# Patient Record
Sex: FEMALE | Race: WHITE
[De-identification: names, ages, dates, MRNs, and addresses within clinical notes are randomized per-mention and may not be internally consistent; named-entity substitution may affect disease eponyms.]

---

## 2018-03-06 ENCOUNTER — HOSPITAL ENCOUNTER (INPATIENT)
Dept: HOSPITAL 56 - MW.OB | Age: 33
LOS: 2 days | Discharge: HOME | End: 2018-03-08
Attending: OBSTETRICS & GYNECOLOGY | Admitting: OBSTETRICS & GYNECOLOGY
Payer: COMMERCIAL

## 2018-03-06 DIAGNOSIS — Z3A.39: ICD-10-CM

## 2018-03-06 RX ADMIN — KETOROLAC TROMETHAMINE SCH MG: 30 INJECTION, SOLUTION INTRAMUSCULAR at 14:56

## 2018-03-06 RX ADMIN — KETOROLAC TROMETHAMINE SCH MG: 30 INJECTION, SOLUTION INTRAMUSCULAR at 20:59

## 2018-03-06 RX ADMIN — KETOROLAC TROMETHAMINE SCH MG: 30 INJECTION, SOLUTION INTRAMUSCULAR at 09:13

## 2018-03-06 NOTE — PCM.OPNOTE
- General Post-Op/Procedure Note


Date of Surgery/Procedure: 18


Operative Procedure(s): repeat low tranvsever 


Findings: 





normal pelvis, liveborn female APGAR  weight 3850 grams,


Pre Op Diagnosis: 39 weeks prior , decines vaginal trial of labor


Post-Op Diagnosis: Same


Anesthesia Technique: General ET Tube


Primary Surgeon: Randi Tripp


Anesthesia Provider: Angel Duarte


Assistant: Loan Polanco


Pathology: 





none


Fluid Replacement, Intraop: 1,000


Output, Urine Amount: 20


EBL in mLs: 500


Complications: None Known


Condition: Good

## 2018-03-06 NOTE — OR
SURGEON:

Randi Tripp M.D.

 

DATE OF PROCEDURE:  2018

 

PREOPERATIVE DIAGNOSES:

1. 39 week intrauterine pregnancy.

2. Prior  delivery.

3. Desires repeat , declines vaginal trial of labor.

 

POSTOPERATIVE DIAGNOSES:

1. 39 week intrauterine pregnancy.

2. Prior  delivery.

3. Desires repeat , declines vaginal trial of labor.

 

PROCEDURE:

Repeat low-transverse  section.

 

ANESTHESIA:

Spinal.

 

ESTIMATED BLOOD LOSS:

500 mL.

 

FLUIDS:

1000 mL crystalloid.

 

FINDINGS:

Liveborn female, Apgar scores 9 and 9.  Weight is 3850 g.  Normal-appearing

uterus, tubes, and ovaries.

 

COMPLICATIONS:

None known.

 

DISPOSITION:

Stable to recovery.

 

BRIEF HISTORY:

This is a 32-year-old female, she is -0-0-1.  She presents at 39 weeks'

gestation for repeat .  She has been counseled regarding risks and

benefits of vaginal trial of labor versus repeat .  She desires to

proceed with repeat  delivery with risks discussed including bleeding,

infection, injury to bowel, bladder, blood vessels, ureter, or other organs,

risk of thromboembolic event, and risk of anesthesia.  Understanding all these

risks, she does desire to proceed.

 

DESCRIPTION OF PROCEDURE:

With the patient in left tilt position, under adequate spinal analgesia, the

abdomen was prepped with chlorhexidine and draped in usual fashion for abdominal

surgery.  SCDs were in place.  Allan catheter had been placed and she had

received 2 g of Ancef IV and an appropriate time-out was held.  After

documentation of adequate analgesia, the prior cicatrix was excised and the

incision was carried through the subcutaneous tissue to the fascia, which was

scored transversely in the midline.  The fascial incision was extended laterally

using curved Perkins scissors and elevated from the underlying rectus muscle using

sharp and blunt dissection.  The rectus muscles were bluntly  in the

midline.  A finger was used to enter the peritoneal cavity.  There were no

anterior adhesions.  The incision was extended using sharp and blunt dissection.

The Trace O  retractor was placed.  The visceroperitoneum over the

lower uterine segment was incised to develop an adequate bladder flap.  A

transverse curvilinear incision was made with a scalpel over the lower uterine

segment and a finger was used to enter the amniotic cavity.  The incision was

extended using blunt dissection.  The fetal head was delivered via the uterine

incision.  The infant was bulb suctioned by nose and mouth.  Subsequent delivery

of the infant's shoulders and body was performed.  The cord was clamped x2 and

cut and the infant was handed to the nurse in attendance at delivery.  The

infant is a liveborn female, Apgar scores 9 and 9 weighing 3850 g.  Cord blood

was collected for cord ABGs as well as routine cord blood sampling.  The

placenta was removed by manual extraction.  Pitocin had been given 20 units and

the cervix was opened with a ring forceps.  The uterine incision was closed with

a running lock suture of 0 Polysorb.  The uterus continued to be somewhat boggy

although there was no active bleeding.  I did ask Anesthesia to give 0.2 mg of

Methergine IM.  Shortly thereafter, the uterus was firm.  The uterine incision

was then closed with a second layer for imbrication of 0 Polysorb.  The

posterior cul-de-sac and paracolic gutters were cleaned with a wet laparotomy

tape.  The uterus, tubes, and ovaries were inspected and appeared normal.  The

uterine incision was inspected and it was hemostatic, therefore the Trace O C-

section retractor was placed.  A final inspection was performed and then the

rectus muscle and peritoneum were loosely approximated in the midline using a

running mattress suture of 0 Polysorb.  The posterior aspect of the fascia was

inspected, any areas of bleeding that were noted were cauterized.  The fascial

incision was closed with a running suture of 0 Polysorb.  Subcutaneous tissue

was copiously irrigated.  Any areas of bleeding that were noted were cauterized.

The deep subcutaneous tissue was closed with a running suture of 0 Polysorb.

The skin was closed with a running subcuticular suture of 3-0 Polysorb followed

by Dermabond.  Final sponge, needle, and instrument counts were reported as

correct.  There were no known complications.  Infant went to full-term nursery

in good condition.  Mother remains in recovery in good condition.

 

 

NED / LUCI

DD:  2018 09:05:49

DT:  2018 13:33:01

Job #:  [id number]

## 2018-03-06 NOTE — [id number]
Post Anesthesia Note





- EVALUATION WITHIN 48HRS OF ANESTHETIC


Vital Signs in Normal Range: Yes


Patient Participated in Evaluation: Yes (still feeling tingling in legs)


Respiratory Function Stable: Yes


Airway Patent: Yes


Cardiovascular Function Stable: Yes


Hydration Status Stable: Yes


Pain Control Satisfactory: Yes


Nausea and Vomiting Control Satisfactory: Yes


Mental Status Recovered: Yes (light headed feeling. discussed stopping benedryl 

for pruritis)


Resp Rate: 18





- COMMENTS/OBSERVATIONS


Free Text/Narrative:: 





also may be side effect of nubain given in operating room

## 2018-03-06 NOTE — PCM.PREANE
Preanesthetic Assessment





- Anesthesia/Transfusion/Family Hx


Anesthesia History: Prior Anesthesia Without Reaction


Other Type of Anesthesia Reaction Comment: Family History of Anesthesia:  The 

patient is "not sure".  


Family History of Anesthesia Reaction: No


Transfusion History: No Prior Transfusion(s)





- Review of Systems


General: No Symptoms


Pulmonary: No Symptoms


Cardiovascular: No Symptoms


Gastrointestinal: No Symptoms


Neurological: No Symptoms


Other: Reports: None





- Physical Assessment


NPO Status Date: 18


Height: 1.75 m


Weight: 80.286 kg


ASA Class: 2


Mental Status: Alert & Oriented x3


Airway Class: Mallampati = 1


Dentition: Reports: Normal Dentition


ROM/Head Extension: Full


Lungs: Clear to Auscultation, Normal Respiratory Effort


Cardiovascular: Regular Rate, Regular Rhythm





- Lab


Values: 





 Laboratory Last Values











WBC  9.42 K/uL (4.0-11.0)   18  05:27    


 


RBC  4.08 M/uL (4.30-5.90)  L  18  05:27    


 


Hgb  13.1 g/dL (12.0-16.0)   18  05:27    


 


Hct  38.8 % (36.0-46.0)   18  05:27    


 


MCV  95.1 fL (80.0-98.0)   18  05:27    


 


MCH  32.1 pg (27.0-32.0)  H  18  05:27    


 


MCHC  33.8 g/dL (31.0-37.0)   18  05:27    


 


RDW Std Deviation  48.9 fl (28.0-62.0)   18  05:27    


 


RDW Coeff of Isidro  14 % (11.0-15.0)   18  05:27    


 


Plt Count  279 K/uL (150-400)   18  05:27    


 


MPV  9.50 fL (7.40-12.00)   18  05:27    


 


Nucleated RBC %  0.0 /100WBC  18  05:27    


 


Nucleated RBCs #  0 K/uL  18  05:27    


 


POC Glucose  79 mg/dL ()   18  05:27    














- Allergies


Allergies/Adverse Reactions: 


 Allergies











Allergy/AdvReac Type Severity Reaction Status Date / Time


 


penicillin G Allergy  Rash Verified 18 08:34


 


Sulfa (Sulfonamide Allergy  Rash Verified 18 08:34





Antibiotics)     














- Blood


Blood Available: Yes





- Anesthesia Plan


Pre-Op Medication Ordered: Antacids





- Acknowledgements


Anesthesia Type Planned: Spinal


Pt an Appropriate Candidate for the Planned Anesthesia: Yes


Alternatives and Risks of Anesthesia Discussed w Pt/Guardian: Yes


Pt/Guardian Understands and Agrees with Anesthesia Plan: Yes


Additional Comments: 





scheduled repeat elective c/section. has gest DM, taking hs insulin, cut last 

nights dose in half.  blood sugar at 0630 this am was in 70s, asymptomatic.  

Will start piggyback infusion of D5, preoperatively. 





PreAnesthesia Questionnaire


HEENT History: Reports: Other (See Below)


Other HEENT History: wears glasses/contacts


Cardiovascular History: Reports: None


Respiratory History: Reports: None


Gastrointestinal History: Reports: None


Genitourinary History: Reports: None


OB/GYN History: Reports: Pregnancy


Musculoskeletal History: Reports: None


Neurological History: Reports: None


Psychiatric History: Reports: None


Endocrine/Metabolic History: Reports: Diabetes, Gestational


Hematologic History: Reports: None


Immunologic History: Reports: None


Oncologic (Cancer) History: Reports: None


Dermatologic History: Reports: None





- Past Surgical History


Head Surgeries/Procedures: Reports: None


HEENT Surgical History: Reports: None


Cardiovascular Surgical History: Reports: None


Respiratory Surgical History: Reports: None


GI Surgical History: Reports: None


Female  Surgical History: Reports:  Section


Endocrine Surgical History: Reports: None


Neurological Surgical History: Reports: None


Musculoskeletal Surgical History: Reports: None


Oncologic Surgical History: Reports: None


Dermatological Surgical History: Reports: None





- SUBSTANCE USE


Smoking Status *Q: Unknown Ever Smoked


Second Hand Smoke Exposure: Yes


Recreational Drug Use History: No





- HOME MEDS


Home Medications: 


 Home Meds





Nph, Human Insulin Isophane [HumuLIN N] 15 units SUBCUT BEDTIME 16 [

History]


PNV95/Ferrous Fumarate/FA [Prenatal Vitamin Tablet] 1 tab PO DAILY 18 [

History]











- CURRENT (IN HOUSE) MEDS


Current Meds: 





 Current Medications





Citric Acid/Sodium Citrate (Bicitra Solution)  30 ml PO .ONCE TARAN


   Last Admin: 18 07:08 Dose:  30 ml


Lactated Ringer's (Ringers, Lactated)  1,000 mls @ 500 mls/hr IV .BOLUS TARAN


   Last Admin: 18 06:09 Dose:  500 mls/hr


Oxytocin/Sodium Chloride (Oxytocin 30 Unit/500 Ml-Ns)  30 unit in 500 mls @ 250 

mls/hr IV TITRATE TARAN


Sodium Chloride (Saline Flush)  10 ml FLUSH ASDIRECTED PRN


   PRN Reason: Keep Vein Open


Sodium Chloride (Saline Flush)  2.5 ml FLUSH ASDIRECTED PRN


   PRN Reason: Keep Vein Open





Discontinued Medications





Cefazolin Sodium (Ancef) Confirm Administered Dose 2 gm .ROUTE .STK-MED ONE


   Stop: 18 07:32


Ephedrine Sulfate (Ephedrine Sulfate) Confirm Administered Dose 50 mg .ROUTE 

.STK-MED ONE


   Stop: 18 07:33


Fentanyl (Sublimaze) Confirm Administered Dose 100 mcg .ROUTE .STK-MED ONE


   Stop: 18 07:30


Cefazolin Sodium/Dextrose 2 gm (/ Premix)  50 mls @ 100 mls/hr IV ONETIME ONE


   Stop: 18 05:36


Sodium Chloride (Normal Saline) Confirm Administered Dose 20 mls @ as directed 

.ROUTE .STK-MED ONE


   Stop: 18 07:33


Morphine Sulfate (Duramorph Pf) Confirm Administered Dose 1 mg .ROUTE .STK-MED 

ONE


   Stop: 18 07:30


Oxytocin (Pitocin) Confirm Administered Dose 20 unit .ROUTE .STK-MED ONE


   Stop: 18 07:33

## 2018-03-07 LAB
CHLORIDE SERPL-SCNC: 105 MMOL/L (ref 98–107)
SODIUM SERPL-SCNC: 138 MMOL/L (ref 136–145)

## 2018-03-07 RX ADMIN — OXYCODONE HYDROCHLORIDE AND ACETAMINOPHEN PRN TAB: 5; 325 TABLET ORAL at 20:42

## 2018-03-07 RX ADMIN — KETOROLAC TROMETHAMINE SCH MG: 30 INJECTION, SOLUTION INTRAMUSCULAR at 03:23

## 2018-03-07 RX ADMIN — OXYCODONE HYDROCHLORIDE AND ACETAMINOPHEN PRN TAB: 5; 325 TABLET ORAL at 12:45

## 2018-03-07 RX ADMIN — KETOROLAC TROMETHAMINE SCH MG: 30 INJECTION, SOLUTION INTRAMUSCULAR at 09:07

## 2018-03-07 NOTE — PCM.PNPP
- General Info


Date of Service: 18


Functional Status: Reports: Pain Controlled, Tolerating Diet, Ambulating, 

Urinating





- Review of Systems


General: Reports: No Symptoms


HEENT: Reports: No Symptoms


Pulmonary: Reports: No Symptoms


Cardiovascular: Reports: No Symptoms


Gastrointestinal: Reports: No Symptoms


Genitourinary: Reports: No Symptoms


Musculoskeletal: Reports: No Symptoms


Skin: Reports: No Symptoms


Neurological: Reports: No Symptoms


Psychiatric: Reports: No Symptoms





- Patient Data


Vital Signs - Most Recent: 


 Last Vital Signs











Temp  36.6 C   18 08:00


 


Pulse  78   18 08:00


 


Resp  16   18 08:00


 


BP  110/61   18 08:00


 


Pulse Ox  97   18 08:00











Weight - Most Recent: 80.286 kg


I&O - Last 24 Hours: 


 Intake & Output











 18





 22:59 06:59 14:59


 


Intake Total 500 1502 


 


Output Total 750 2400 


 


Balance -250 -898 











Lab Results - Last 24 Hours: 


 Laboratory Results - last 24 hr











  18 Range/Units





  09:18 09:52 05:32 


 


Hgb    11.0 L  (12.0-16.0)  g/dL


 


Hct    32.6 L  (36.0-46.0)  %


 


Sodium     (136-145)  mmol/L


 


Potassium     (3.5-5.1)  mmol/L


 


Chloride     ()  mmol/L


 


Carbon Dioxide     (21.0-32.0)  mmol/L


 


BUN     (7.0-18.0)  mg/dL


 


Creatinine     (0.6-1.0)  mg/dL


 


Est Cr Clr Drug Dosing     mL/min


 


Estimated GFR (MDRD)     ml/min


 


Glucose     ()  mg/dL


 


POC Glucose  112 H    ()  mg/dL


 


Calcium     (8.5-10.1)  mg/dL


 


Fetal Screen   NEGATIVE   (NEGATIVE)  


 


RhIG Candidate?   YES   


 


Rhogam Indicated   YES, BABY RH POS H   














  18 Range/Units





  05:32 


 


Hgb   (12.0-16.0)  g/dL


 


Hct   (36.0-46.0)  %


 


Sodium  138  (136-145)  mmol/L


 


Potassium  4.2  (3.5-5.1)  mmol/L


 


Chloride  105  ()  mmol/L


 


Carbon Dioxide  27.1  (21.0-32.0)  mmol/L


 


BUN  8  (7.0-18.0)  mg/dL


 


Creatinine  0.7  (0.6-1.0)  mg/dL


 


Est Cr Clr Drug Dosing  118.35  mL/min


 


Estimated GFR (MDRD)  > 60.0  ml/min


 


Glucose  78  ()  mg/dL


 


POC Glucose   ()  mg/dL


 


Calcium  8.4 L  (8.5-10.1)  mg/dL


 


Fetal Screen   (NEGATIVE)  


 


RhIG Candidate?   


 


Rhogam Indicated   











Med Orders - Current: 


 Current Medications





Bisacodyl (Dulcolax)  10 mg RECTAL .ONCE PRN


   PRN Reason: Constipation


Diphenhydramine HCl (Benadryl)  25 mg IVPUSH Q6H PRN


   PRN Reason: Itching or Nausea


   Last Admin: 18 13:32 Dose:  25 mg


Docusate Sodium (Colace)  100 mg PO BID [facility]


   Last Admin: 18 21:00 Dose:  100 mg


Emollient Ointment (Lansinoh Hpa)  0 gm TOP ASDIRECTED PRN


   PRN Reason: Sore Nipples


Fentanyl (Sublimaze)  50 mcg IVPUSH Q5M PRN


   PRN Reason: Pain (severe 7-10)


   Stop: 18 08:23


Dextrose/Water (Dextrose 5% In Water)  1,000 mls @ 75 mls/hr IV ASDIRECTED [facility]


Lactated Ringer's (Ringers, Lactated)  1,000 mls @ 125 mls/hr IV ASDIRECTED [facility]


   Last Admin: 18 14:57 Dose:  125 mls/hr


Ibuprofen (Motrin)  800 mg PO Q8H PRN


   PRN Reason: mild pain or fever


Ketorolac Tromethamine (Toradol)  30 mg IVPUSH Q6H [facility]


   Stop: 18 09:01


   Last Admin: 18 03:23 Dose:  30 mg


Nalbuphine HCl (Nubain)  2.5 mg IVPUSH Q3H PRN


   PRN Reason: Pruritis


   Stop: 18 08:23


Ondansetron HCl (Zofran)  4 mg IV Q4H PRN


   PRN Reason: Nausea/Vomiting


Oxycodone/Acetaminophen (Percocet 325-5 Mg)  1 tab PO ONETIME PRN


   PRN Reason: Pain (moderate 4-6)


Oxycodone/Acetaminophen (Percocet 325-5 Mg)  1 tab PO Q4H PRN


   PRN Reason: Pain (moderate 4-6)


Oxycodone/Acetaminophen (Percocet 325-5 Mg)  2 tab PO Q4H PRN


   PRN Reason: Pain (moderate 4-6)





Discontinued Medications





Cefazolin Sodium (Ancef) Confirm Administered Dose 2 gm .ROUTE .STK-MED ONE


   Stop: 18 07:32


Citric Acid/Sodium Citrate (Bicitra Solution)  30 ml PO .ONCE TARAN


   Last Admin: 18 07:08 Dose:  30 ml


Ephedrine Sulfate (Ephedrine Sulfate) Confirm Administered Dose 50 mg .ROUTE 

.STK-MED ONE


   Stop: 18 07:33


Fentanyl (Sublimaze) Confirm Administered Dose 100 mcg .ROUTE .STK-MED ONE


   Stop: 18 07:30


Cefazolin Sodium/Dextrose 2 gm (/ Premix)  50 mls @ 100 mls/hr IV ONETIME ONE


   Stop: 18 05:36


Lactated Ringer's (Ringers, Lactated)  1,000 mls @ 500 mls/hr IV .BOLUS TARAN


   Last Admin: 18 06:09 Dose:  500 mls/hr


Oxytocin/Sodium Chloride (Oxytocin 30 Unit/500 Ml-Ns)  30 unit in 500 mls @ 250 

mls/hr IV TITRATE TARAN


Sodium Chloride (Normal Saline) Confirm Administered Dose 20 mls @ as directed 

.ROUTE .STK-MED ONE


   Stop: 18 07:33


Ketorolac Tromethamine (Toradol) Confirm Administered Dose 30 mg .ROUTE .STK-

MED ONE


   Stop: 18 08:31


Methylergonovine Maleate (Methergine) Confirm Administered Dose 0.2 mg .ROUTE 

.STK-MED ONE


   Stop: 18 08:23


Morphine Sulfate (Duramorph Pf) Confirm Administered Dose 1 mg .ROUTE .STK-MED 

ONE


   Stop: 18 07:30


Nalbuphine HCl (Nubain) Confirm Administered Dose 10 mg .ROUTE .STK-MED ONE


   Stop: 18 08:33


Octyl Cyanoacrylate (Dermabond Advance) Confirm Administered Dose 1 applic 

.ROUTE .STK-MED ONE


   Stop: 18 08:34


Ondansetron HCl (Zofran) Confirm Administered Dose 4 mg .ROUTE .STK-MED ONE


   Stop: 18 08:32


Oxytocin (Pitocin) Confirm Administered Dose 20 unit .ROUTE .STK-MED ONE


   Stop: 18 07:33


Sodium Chloride (Saline Flush)  10 ml FLUSH ASDIRECTED PRN


   PRN Reason: Keep Vein Open


Sodium Chloride (Saline Flush)  2.5 ml FLUSH ASDIRECTED PRN


   PRN Reason: Keep Vein Open











- Infant Interaction


Infant Disposition, Postpartum: [place] in Room with Family


Infant Interaction: Holding Infant


Infant Feeding:  Infant; Nursed Well


Support Person: 





- Postpartum Recovery Exam


Fundal Tone: Firm


Fundal Level: 1 Fingerbreadths Below Umbilicus


Fundal Placement: Midline


Lochia Amount: Scant


Lochia Color: Rubra/Red


Perineum Description: Intact, Minimal Bruising/Swelling


Bladder Status: Voiding


Urinary Elimination: Indwelling Catheter





- Exam


General: Alert, Oriented


HEENT: Pupils Equal


Neck: Supple


Lungs: Clear to Auscultation, Normal Respiratory Effort


Cardiovascular: Regular Rate, Regular Rhythm


GI/Abdominal Exam: Normal Bowel Sounds, Soft, Non-Tender, No Organomegaly, No 

Distention


Extremities: Normal Inspection, Non-Tender, No Pedal Edema


Skin: Warm, Dry, Intact


Wound/Incisions: Dressing Dry and Intact


Neurological: No New Focal Deficit


Psy/Mental Status: Alert, Normal Affect, Normal Mood





- Problem List & Annotations


(1)  delivery delivered


SNOMED Code(s): 639700209


   Code(s): O82 - ENCOUNTER FOR  DELIVERY WITHOUT INDICATION   Status: 

Acute   Current Visit: No   





(2) Gestational diabetes mellitus (GDM) in childbirth, insulin controlled


SNOMED Code(s): 28448057


   Code(s): O24.424 - GESTATIONAL DIABETES IN CHILDBIRTH, INSULIN CONTROLLED   

Status: Acute   Current Visit: No   





- Problem List Review


Problem List Initiated/Reviewed/Updated: Yes





- My Orders


Last 24 Hours: 


My Active Orders





18 08:52


Patient Status [ADT] Routine 


Ambulate [RC] PER UNIT ROUTINE 


Antiembolic Devices [RC] PER UNIT ROUTINE 


Communication Order [RC] PER UNIT ROUTINE 


Intake and Output [RC] Q12H 


May Shower [RC] ASDIRECTED 


Notify Provider Intake and Out [RC] ASDIRECTED 


Notify Provider Vital Signs [RC] ASDIRECTED 


RT Incentive Spirometry [RC] Q2HWA 


Vital Signs [RC] PER UNIT ROUTINE 


Acetaminophen/oxyCODONE [Percocet 325-5 MG]   1 tab PO Q4H PRN 


Acetaminophen/oxyCODONE [Percocet 325-5 MG]   2 tab PO Q4H PRN 


Bisacodyl [Dulcolax]   10 mg RECTAL .ONCE PRN 


Lanolin [Lansinoh HPA]   See Dose Instructions  TOP ASDIRECTED PRN 


Ondansetron [Zofran]   4 mg IV Q4H PRN 


diphenhydrAMINE [Benadryl]   25 mg IVPUSH Q6H PRN 


Abdominal Binder [OM.PC] Routine 


Assess Lochia [WOMSER] Per Unit Routine 


Assess Uterine Involution [WOMSER] Per Unit Routine 


Breast Pump [WOMSER] Per Unit Routine 


Heat Therapy [OM.PC] Routine 


Peripheral IV Discontinue [OM.PC] Routine 


Sequential Compression Device [OM.PC] Per Unit Routine 


Resuscitation Status Routine 





18 09:00


Docusate Sodium [Colace]   100 mg PO BID 


Ketorolac [Toradol]   30 mg IVPUSH Q6H 


Lactated Ringers [Ringers, Lactated] 1,000 ml IV ASDIRECTED 





18 Dinner


Regular Diet [DIET] 





18 Lunch


Regular Diet [DIET] 





18 15:00


Ibuprofen [Motrin]   800 mg PO Q8H PRN 














- Assessment


Assessment:: 





PPD#1 after repeat , gestational diabetes.  Normal glucose this am. 

Tolerating diet, pain well controlled





- Plan


Plan:: 





Continue postpartum care, does not need to continue glucose testing, will get 2 

hour GTT at 6 weeks postpartum.  Anticipate discharge tomorrow

## 2018-03-08 VITALS — SYSTOLIC BLOOD PRESSURE: 112 MMHG | DIASTOLIC BLOOD PRESSURE: 62 MMHG

## 2018-03-08 RX ADMIN — OXYCODONE HYDROCHLORIDE AND ACETAMINOPHEN PRN TAB: 5; 325 TABLET ORAL at 06:02

## 2018-03-08 RX ADMIN — OXYCODONE HYDROCHLORIDE AND ACETAMINOPHEN PRN TAB: 5; 325 TABLET ORAL at 10:41

## 2018-03-08 NOTE — PCM.PNPP
<Shaista Melchor - Last Filed: 18 08:03>





- General Info


Date of Service: 18


Functional Status: Reports: Pain Controlled, Tolerating Diet, Ambulating, 

Urinating





- Review of Systems


General: Denies: Fever, Weakness, Fatigue


Pulmonary: Denies: Shortness of Breath, Pleuritic Chest Pain, Cough


Cardiovascular: Denies: Chest Pain, Palpitations, Dyspnea on Exertion


Gastrointestinal: Denies: Abdominal Pain


Genitourinary: Denies: Dysuria





- General Info


Date of Service: 18





- Patient Data


Vital Signs - Most Recent: 


 Last Vital Signs











Temp  36.3 C   18 07:34


 


Pulse  97   18 07:34


 


Resp  20   18 07:34


 


BP  112/62   18 07:34


 


Pulse Ox  95   18 07:34











Weight - Most Recent: 80.286 kg


Med Orders - Current: 


 Current Medications





Bisacodyl (Dulcolax)  10 mg RECTAL .ONCE PRN


   PRN Reason: Constipation


Diphenhydramine HCl (Benadryl)  25 mg IVPUSH Q6H PRN


   PRN Reason: Itching or Nausea


   Last Admin: 18 13:32 Dose:  25 mg


Docusate Sodium (Colace)  100 mg PO BID [facility]


   Last Admin: 18 20:41 Dose:  100 mg


Emollient Ointment (Lansinoh Hpa)  0 gm TOP ASDIRECTED PRN


   PRN Reason: Sore Nipples


Dextrose/Water (Dextrose 5% In Water)  1,000 mls @ 75 mls/hr IV ASDIRECTED [facility]


Lactated Ringer's (Ringers, Lactated)  1,000 mls @ 125 mls/hr IV ASDIRECTED [facility]


   Last Admin: 18 14:57 Dose:  125 mls/hr


Ibuprofen (Motrin)  800 mg PO Q8H PRN


   PRN Reason: mild pain or fever


   Last Admin: 18 01:31 Dose:  800 mg


Ondansetron HCl (Zofran)  4 mg IV Q4H PRN


   PRN Reason: Nausea/Vomiting


Oxycodone/Acetaminophen (Percocet 325-5 Mg)  1 tab PO ONETIME PRN


   PRN Reason: Pain (moderate 4-6)


Oxycodone/Acetaminophen (Percocet 325-5 Mg)  1 tab PO Q4H PRN


   PRN Reason: Pain (moderate 4-6)


   Last Admin: 18 06:02 Dose:  1 tab


Oxycodone/Acetaminophen (Percocet 325-5 Mg)  2 tab PO Q4H PRN


   PRN Reason: Pain (moderate 4-6)





Discontinued Medications





Cefazolin Sodium (Ancef) Confirm Administered Dose 2 gm .ROUTE .STK-MED ONE


   Stop: 18 07:32


Citric Acid/Sodium Citrate (Bicitra Solution)  30 ml PO .ONCE [facility]


   Last Admin: 18 07:08 Dose:  30 ml


Ephedrine Sulfate (Ephedrine Sulfate) Confirm Administered Dose 50 mg .ROUTE 

.STK-MED ONE


   Stop: 18 07:33


Fentanyl (Sublimaze) Confirm Administered Dose 100 mcg .ROUTE .STK-MED ONE


   Stop: 18 07:30


Fentanyl (Sublimaze)  50 mcg IVPUSH Q5M PRN


   PRN Reason: Pain (severe 7-10)


   Stop: 18 08:23


Cefazolin Sodium/Dextrose 2 gm (/ Premix)  50 mls @ 100 mls/hr IV ONETIME ONE


   Stop: 18 05:36


Lactated Ringer's (Ringers, Lactated)  1,000 mls @ 500 mls/hr IV .BOLUS [facility]


   Last Admin: 18 06:09 Dose:  500 mls/hr


Oxytocin/Sodium Chloride (Oxytocin 30 Unit/500 Ml-Ns)  30 unit in 500 mls @ 250 

mls/hr IV TITRATE [facility]


Sodium Chloride (Normal Saline) Confirm Administered Dose 20 mls @ as directed 

.ROUTE .STK-MED ONE


   Stop: 18 07:33


Ketorolac Tromethamine (Toradol) Confirm Administered Dose 30 mg .ROUTE .STK-

MED ONE


   Stop: 18 08:31


Ketorolac Tromethamine (Toradol)  30 mg IVPUSH Q6H [facility]


   Stop: 18 09:01


   Last Admin: 18 09:07 Dose:  30 mg


Methylergonovine Maleate (Methergine) Confirm Administered Dose 0.2 mg .ROUTE 

.STK-MED ONE


   Stop: 18 08:23


Morphine Sulfate (Duramorph Pf) Confirm Administered Dose 1 mg .ROUTE .STK-MED 

ONE


   Stop: 18 07:30


Nalbuphine HCl (Nubain)  2.5 mg IVPUSH Q3H PRN


   PRN Reason: Pruritis


   Stop: 18 08:23


Nalbuphine HCl (Nubain) Confirm Administered Dose 10 mg .ROUTE .STK-MED ONE


   Stop: 18 08:33


Octyl Cyanoacrylate (Dermabond Advance) Confirm Administered Dose 1 applic 

.ROUTE .STK-MED ONE


   Stop: 18 08:34


Ondansetron HCl (Zofran) Confirm Administered Dose 4 mg .ROUTE .STK-MED ONE


   Stop: 18 08:32


Oxytocin (Pitocin) Confirm Administered Dose 20 unit .ROUTE .STK-MED ONE


   Stop: 18 07:33


Sodium Chloride (Saline Flush)  10 ml FLUSH ASDIRECTED PRN


   PRN Reason: Keep Vein Open


Sodium Chloride (Saline Flush)  2.5 ml FLUSH ASDIRECTED PRN


   PRN Reason: Keep Vein Open











- Infant Interaction


Infant Disposition, Postpartum: [place] in Room with Family


Infant Interaction: Holding Infant


Infant Feeding:  Infant; Nursed Well


Support Person: 





- Postpartum Recovery Exam


Fundal Tone: Firm


Fundal Level: 1 Fingerbreadths Below Umbilicus


Fundal Placement: Midline


Lochia Amount: Scant


Lochia Color: Rubra/Red


Perineum Description: Intact, Minimal Bruising/Swelling


Bladder Status: Voiding


Urinary Elimination: Voided





- Exam


General: Alert, Oriented


Neck: Supple


Lungs: Clear to Auscultation, Normal Respiratory Effort


Cardiovascular: Regular Rate, Regular Rhythm


GI/Abdominal Exam: Normal Bowel Sounds, No Distention, No Mass


Extremities: Normal Inspection, Pedal Edema (trace)


Skin: Warm, Dry, Intact


Psy/Mental Status: Alert, Normal Affect, Normal Mood





- Problem List & Annotations


(1)  delivery delivered


SNOMED Code(s): 913875188


   Code(s): O82 - ENCOUNTER FOR  DELIVERY WITHOUT INDICATION   Status: 

Acute   Current Visit: No   





- Problem List Review


Problem List Initiated/Reviewed/Updated: Yes





- Assessment


Assessment:: 





PPD#2 after repeat . Minimal pain and lochia. Breast feeding well. 

Discharge home today.





- Plan


Plan:: 


Discharge home today. Nothing in the vagina for 6 weeks. Continue PNV while 

breast feeding. Rx for Percocet to use as needed for pain. Instructed patient 

to call if she develops fever greater than 101 or bleeding through a large pad 

an hour. No lifting greater than 10lbs for 6 weeks. F/U with [facility] in 2 and 6 

weeks. Will get 2 hour GTT at 6 weeks postpartu visit. 





<Randi Tripp - Last Filed: 18 08:17>





- Patient Data


Vital Signs - Most Recent: 


 Last Vital Signs











Temp  36.3 C   18 07:34


 


Pulse  97   18 07:34


 


Resp  20   18 07:34


 


BP  112/62   18 07:34


 


Pulse Ox  95   18 07:34











Med Orders - Current: 


 Current Medications





Bisacodyl (Dulcolax)  10 mg RECTAL .ONCE PRN


   PRN Reason: Constipation


Diphenhydramine HCl (Benadryl)  25 mg IVPUSH Q6H PRN


   PRN Reason: Itching or Nausea


   Last Admin: 18 13:32 Dose:  25 mg


Docusate Sodium (Colace)  100 mg PO BID [facility]


   Last Admin: 18 20:41 Dose:  100 mg


Emollient Ointment (Lansinoh Hpa)  0 gm TOP ASDIRECTED PRN


   PRN Reason: Sore Nipples


Dextrose/Water (Dextrose 5% In Water)  1,000 mls @ 75 mls/hr IV ASDIRECTED [facility]


Lactated Ringer's (Ringers, Lactated)  1,000 mls @ 125 mls/hr IV ASDIRECTED [facility]


   Last Admin: 18 14:57 Dose:  125 mls/hr


Ibuprofen (Motrin)  800 mg PO Q8H PRN


   PRN Reason: mild pain or fever


   Last Admin: 18 01:31 Dose:  800 mg


Ondansetron HCl (Zofran)  4 mg IV Q4H PRN


   PRN Reason: Nausea/Vomiting


Oxycodone/Acetaminophen (Percocet 325-5 Mg)  1 tab PO ONETIME PRN


   PRN Reason: Pain (moderate 4-6)


Oxycodone/Acetaminophen (Percocet 325-5 Mg)  1 tab PO Q4H PRN


   PRN Reason: Pain (moderate 4-6)


   Last Admin: 18 06:02 Dose:  1 tab


Oxycodone/Acetaminophen (Percocet 325-5 Mg)  2 tab PO Q4H PRN


   PRN Reason: Pain (moderate 4-6)





Discontinued Medications





Cefazolin Sodium (Ancef) Confirm Administered Dose 2 gm .ROUTE .STK-MED ONE


   Stop: 18 07:32


Citric Acid/Sodium Citrate (Bicitra Solution)  30 ml PO .ONCE [facility]


   Last Admin: 18 07:08 Dose:  30 ml


Ephedrine Sulfate (Ephedrine Sulfate) Confirm Administered Dose 50 mg .ROUTE 

.STK-MED ONE


   Stop: 18 07:33


Fentanyl (Sublimaze) Confirm Administered Dose 100 mcg .ROUTE .STK-MED ONE


   Stop: 18 07:30


Fentanyl (Sublimaze)  50 mcg IVPUSH Q5M PRN


   PRN Reason: Pain (severe 7-10)


   Stop: 18 08:23


Cefazolin Sodium/Dextrose 2 gm (/ Premix)  50 mls @ 100 mls/hr IV ONETIME ONE


   Stop: 18 05:36


Lactated Ringer's (Ringers, Lactated)  1,000 mls @ 500 mls/hr IV .BOLUS [facility]


   Last Admin: 18 06:09 Dose:  500 mls/hr


Oxytocin/Sodium Chloride (Oxytocin 30 Unit/500 Ml-Ns)  30 unit in 500 mls @ 250 

mls/hr IV TITRATE [facility]


Sodium Chloride (Normal Saline) Confirm Administered Dose 20 mls @ as directed 

.ROUTE .STK-MED ONE


   Stop: 18 07:33


Ketorolac Tromethamine (Toradol) Confirm Administered Dose 30 mg .ROUTE .STK-

MED ONE


   Stop: 18 08:31


Ketorolac Tromethamine (Toradol)  30 mg IVPUSH Q6H [facility]


   Stop: 18 09:01


   Last Admin: 18 09:07 Dose:  30 mg


Methylergonovine Maleate (Methergine) Confirm Administered Dose 0.2 mg .ROUTE 

.STK-MED ONE


   Stop: 18 08:23


Morphine Sulfate (Duramorph Pf) Confirm Administered Dose 1 mg .ROUTE .STK-MED 

ONE


   Stop: 18 07:30


Nalbuphine HCl (Nubain)  2.5 mg IVPUSH Q3H PRN


   PRN Reason: Pruritis


   Stop: 18 08:23


Nalbuphine HCl (Nubain) Confirm Administered Dose 10 mg .ROUTE .STK-MED ONE


   Stop: 18 08:33


Octyl Cyanoacrylate (Dermabond Advance) Confirm Administered Dose 1 applic 

.ROUTE .STK-MED ONE


   Stop: 18 08:34


Ondansetron HCl (Zofran) Confirm Administered Dose 4 mg .ROUTE .STK-MED ONE


   Stop: 18 08:32


Oxytocin (Pitocin) Confirm Administered Dose 20 unit .ROUTE .STK-MED ONE


   Stop: 18 07:33


Sodium Chloride (Saline Flush)  10 ml FLUSH ASDIRECTED PRN


   PRN Reason: Keep Vein Open


Sodium Chloride (Saline Flush)  2.5 ml FLUSH ASDIRECTED PRN


   PRN Reason: Keep Vein Open











- Problem List & Annotations


(1)  delivery delivered


SNOMED Code(s): 909549920


   Code(s): O82 - ENCOUNTER FOR  DELIVERY WITHOUT INDICATION   Status: 

Acute   Current Visit: No   





(2) Gestational diabetes mellitus (GDM) in childbirth, insulin controlled


SNOMED Code(s): 33897966


   Code(s): O24.424 - GESTATIONAL DIABETES IN CHILDBIRTH, INSULIN CONTROLLED   

Status: Acute   Current Visit: No   





- My Orders


Last 24 Hours: 


My Active Orders





18 15:00


Ibuprofen [Motrin]   800 mg PO Q8H PRN 














- Plan


Plan:: 





Patient was seen and examined by me.  Agree with above.

## 2019-11-12 ENCOUNTER — HOSPITAL ENCOUNTER (INPATIENT)
Dept: HOSPITAL 56 - MW.OB | Age: 34
LOS: 2 days | Discharge: HOME | End: 2019-11-14
Attending: OBSTETRICS & GYNECOLOGY | Admitting: OBSTETRICS & GYNECOLOGY
Payer: COMMERCIAL

## 2019-11-12 DIAGNOSIS — Z3A.39: ICD-10-CM

## 2019-11-12 DIAGNOSIS — O34.211: Primary | ICD-10-CM

## 2019-11-12 RX ADMIN — KETOROLAC TROMETHAMINE SCH MG: 30 INJECTION, SOLUTION INTRAMUSCULAR at 16:03

## 2019-11-12 RX ADMIN — DIPHENHYDRAMINE HYDROCHLORIDE PRN MG: 50 INJECTION, SOLUTION INTRAMUSCULAR; INTRAVENOUS at 22:07

## 2019-11-12 RX ADMIN — KETOROLAC TROMETHAMINE SCH MG: 30 INJECTION, SOLUTION INTRAMUSCULAR at 22:07

## 2019-11-12 RX ADMIN — DIPHENHYDRAMINE HYDROCHLORIDE PRN MG: 50 INJECTION, SOLUTION INTRAMUSCULAR; INTRAVENOUS at 10:50

## 2019-11-12 RX ADMIN — KETOROLAC TROMETHAMINE SCH MG: 30 INJECTION, SOLUTION INTRAMUSCULAR at 10:05

## 2019-11-12 NOTE — PCM.PREANE
Preanesthetic Assessment





- Anesthesia/Transfusion/Family Hx


Anesthesia History: Prior Anesthesia Without Reaction


Other Type of Anesthesia Reaction Comment: Family History of Anesthesia:  The 

patient is "not sure".  


Family History of Anesthesia Reaction: No


Transfusion History: No Prior Transfusion(s)





- Review of Systems


General: No Symptoms


Pulmonary: No Symptoms


Cardiovascular: No Symptoms


Gastrointestinal: No Symptoms


Neurological: No Symptoms


Other: Reports: None





- Physical Assessment


NPO Status Date: 19


NPO Status Time: 22:00


Height: 5 ft 9 in


Weight: 79.379 kg


ASA Class: 2


Mental Status: Alert & Oriented x3


Airway Class: Mallampati = 2


Dentition: Reports: Normal Dentition


Thyro-Mental Finger Breadths: 3


Mouth Opening Finger Breadths: 3


ROM/Head Extension: Full


Lungs: Clear to Auscultation, Normal Respiratory Effort


Cardiovascular: Regular Rate, Regular Rhythm





- Lab


Values: 





 Laboratory Last Values











WBC  8.69 K/uL (4.0-11.0)   19  05:56    


 


RBC  3.58 M/uL (4.30-5.90)  L  19  05:56    


 


Hgb  10.9 g/dL (12.0-16.0)  L  19  05:56    


 


Hct  33.7 % (36.0-46.0)  L  19  05:56    


 


MCV  94.1 fL (80.0-98.0)   19  05:56    


 


MCH  30.4 pg (27.0-32.0)   19  05:56    


 


MCHC  32.3 g/dL (31.0-37.0)   19  05:56    


 


RDW Std Deviation  46.9 fl (28.0-62.0)   19  05:56    


 


RDW Coeff of Isidro  14 % (11.0-15.0)   19  05:56    


 


Plt Count  285 K/uL (150-400)   19  05:56    


 


MPV  9.10 fL (7.40-12.00)   19  05:56    


 


Nucleated RBC %  0.0 /100WBC  19  05:56    


 


Nucleated RBCs #  0 K/uL  19  05:56    


 


Blood Type  B NEGATIVE   19  05:56    


 


Antibody Screen  NEGATIVE   19  05:56    














- Allergies


Allergies/Adverse Reactions: 


 Allergies











Allergy/AdvReac Type Severity Reaction Status Date / Time


 


penicillin G Allergy  Rash Verified 19 14:11


 


Sulfa (Sulfonamide Allergy  Rash Verified 19 14:11





Antibiotics)     


 


nuvain Allergy  Hallucinati Uncoded 19 14:12





   ons  














- Anesthesia Plan


Free Text/Narrative:: 





GDM - diet and Insulin controlled.  Pt states she took 5 units of Humulin last 

NOC.  BG this AM 90.  Pt  is at the bedside for interview and consent.


Pre-Op Medication Ordered: Antacids





- Acknowledgements


Anesthesia Type Planned: Spinal (with Duramorph)


Pt an Appropriate Candidate for the Planned Anesthesia: Yes


Alternatives and Risks of Anesthesia Discussed w Pt/Guardian: Yes


Pt/Guardian Understands and Agrees with Anesthesia Plan: Yes





PreAnesthesia Questionnaire


HEENT History: Reports: Other (See Below)


Other HEENT History: wears glasses


Cardiovascular History: Reports: None


Respiratory History: Reports: None


Gastrointestinal History: Reports: GERD


Genitourinary History: Reports: None


OB/GYN History: Reports: None, Pregnancy


: 3


Para: 2


LMP (Approximate): Pregnant


Musculoskeletal History: Reports: None


Neurological History: Reports: None


Psychiatric History: Reports: None


Endocrine/Metabolic History: Reports: Diabetes, Gestational


Hematologic History: Reports: Anemia


Immunologic History: Reports: None


Oncologic (Cancer) History: Reports: None


Dermatologic History: Reports: None





- Infectious Disease History


Infectious Disease History: Reports: Chicken Pox





- Past Surgical History


Head Surgeries/Procedures: Reports: None


HEENT Surgical History: Reports: None


Cardiovascular Surgical History: Reports: None


Respiratory Surgical History: Reports: None


GI Surgical History: Reports: None


Female  Surgical History: Reports:  Section


Endocrine Surgical History: Reports: None


Neurological Surgical History: Reports: None


Musculoskeletal Surgical History: Reports: None


Oncologic Surgical History: Reports: None


Dermatological Surgical History: Reports: None





- SUBSTANCE USE


Smoking Status *Q: Never Smoker


Second Hand Smoke Exposure: No





- HOME MEDS


Home Medications: 


 Home Meds





Nph, Human Insulin Isophane [HumuLIN N] 10 units SUBCUT BID 16 [History]


PNV95/Ferrous Fumarate/FA [Prenatal Vitamin Tablet] 1 tab PO DAILY 18 [

History]











- CURRENT (IN HOUSE) MEDS


Current Meds: 





 Current Medications





Lactated Ringer's (Ringers, Lactated)  1,000 mls @ 500 mls/hr IV BOLUS [facility]


   Last Admin: 19 06:44 Dose:  500 mls/hr


Oxytocin/Sodium Chloride (Oxytocin 30 Unit/500 Ml-Ns)  30 unit in 500 mls @ 250 

mls/hr IV TITRATE [facility]


Sodium Chloride (Saline Flush)  10 ml FLUSH ASDIRECTED PRN


   PRN Reason: Keep Vein Open


Sodium Chloride (Saline Flush)  2.5 ml FLUSH ASDIRECTED PRN


   PRN Reason: Keep Vein Open


Sodium Chloride (Normal Saline)  10 ml IV ASDIRECTED PRN


   PRN Reason: IV Use





Discontinued Medications





Citric Acid/Sodium Citrate (Bicitra Solution)  30 ml PO ONETIME ONE


   Stop: 19 05:09


Cefazolin Sodium/Dextrose 2 gm (/ Premix)  50 mls @ 100 mls/hr IV ONETIME ONE


   Stop: 19 05:37


Morphine Sulfate (Duramorph Pf) Confirm Administered Dose 10 mg .ROUTE .STK-MED 

ONE


   Stop: 19 07:13

## 2019-11-12 NOTE — PCM.OPNOTE
- General Post-Op/Procedure Note


Date of Surgery/Procedure: 19


Operative Procedure(s): repeat low transverse 


Findings: 





Normal pelvis, liveborn male APGAR 9/10 weight pending, double nuchal cord 

reduced.


Pre Op Diagnosis: 39 weeks, previous 


Post-Op Diagnosis: Same


Anesthesia Technique: Spinal


Primary Surgeon: Randi Tripp


Anesthesia Provider: Angel Aguilar


Assistant: Suzanne Perkins


Assistant: Mel Osman


Pathology: 


none





Fluid Replacement, Intraop: 800


EBL in mLs: 300


Complications: None Known


Condition: Good


Free Text/Narrative:: 


 Intake & Output











 19





 22:59 06:59 14:59


 


Intake Total  1000 


 


Balance  1000

## 2019-11-12 NOTE — PCM.POSTAN
POST ANESTHESIA ASSESSMENT





- MENTAL STATUS


Mental Status: Alert





- VITAL SIGNS


Vital Signs: 


 Last Vital Signs











Temp  36.1 C   11/12/19 09:38


 


Pulse  72   11/12/19 10:23


 


Resp  12   11/12/19 10:23


 


BP  98/57 L  11/12/19 10:23


 


Pulse Ox  95   11/12/19 10:23














- RESPIRATORY


Respiratory Status: Respiratory Rate WNL





- CARDIOVASCULAR


CV Status: Pulse Rate WNL





- GASTROINTESTINAL


GI Status: No Symptoms





- PAIN


Pain Score: 0





- POST OP HYDRATION


Hydration Status: Adequate & Stable





- OBSERVATIONS


Free Text/Narrative:: 





Doing well.  BPs 100.  No pain or nausea.  Adequate for transfer to Phase 2.

## 2019-11-12 NOTE — OR
SURGEON:

Randi Tripp M.D.

 

DATE OF PROCEDURE:  2019

 

PREOPERATIVE DIAGNOSIS:

A 39-week intrauterine pregnancy, prior  delivery, desires repeat.

 

POSTOPERATIVE DIAGNOSIS:

A 39-week intrauterine pregnancy, prior  delivery, desires repeat.

 

PROCEDURE:

Repeat low-transverse  section.

 

PRIMARY SURGEON:

Randi Tripp MD.

 

ANESTHESIA:

Spinal.

 

ESTIMATED BLOOD LOSS:

300 mL.

 

FLUIDS:

800 mL of crystalloid.

 

FINDINGS:

Liveborn male.  Apgar scores 9 and 10.  Weight is pending.  Double nuchal cord

reduced.  Normal-appearing uterus, tubes, and ovaries.

 

COMPLICATIONS:

None known.

 

DISPOSITION:

Stable to recovery.

 

BRIEF HISTORY:

This is a 34-year-old female.  She presents for repeat  delivery at 39

weeks' gestation.  Prenatal care has been complicated by gestational diabetes

for which she has required NPH insulin.  She had a normal glucose prior to the

surgery at 90.  She presents for a repeat  with risks discussed

including bleeding; infection; injury to bowel, bladder, blood vessels, or other

organs; risk of thromboembolic event; and risk of anesthesia.  Understanding all

these risks, she does desire to proceed.

 

DESCRIPTION OF PROCEDURE:

With the patient in left tilt position, under adequate spinal analgesia, the

abdomen was prepped with chlorhexidine and draped in the usual fashion for

abdominal surgery.  SCDs were in place, Allan catheter had been placed, and an

appropriate time-out was held.  She had received 2 g of Ancef IV.  After

documentation of adequate analgesia, the prior cicatrix was excised with a

scalpel and the incision was carried through the subcutaneous tissue to the

fascia, which was scored transversely in the midline.  The fascial incision was

extended laterally using curved Perkins scissors.  The fascia was elevated from the

underlying rectus muscle using sharp and blunt dissection.  The rectus muscles

were  in midline using a hemostat.  A finger was used to enter the

peritoneal cavity.  There were no anterior adhesions.  The incision was extended

using sharp and blunt dissection.  The Trace O  retractor was placed.

The visceroperitoneum over the lower uterine segment was incised to develop an

adequate bladder flap.  A transverse curvilinear incision was made over the

lower uterine segment, which was noted to be moderately thin, and clear fluid

was noted.  The incision was extended using blunt dissection.  The fetal head

was delivered via the uterine incision.  The double nuchal cord was reduced.

The infant was bulb suctioned by nose and mouth, and with subsequent delivery of

the infant's body, after the cord had ceased to pulsate, it was doubly clamped

and cut.  The infant was handed to the nurse in attendance at delivery.  The

infant is a liveborn male.  Apgar scores 9 and 10.  Weight is pending at the

time of dictation.  Cord blood was collected for cord ABGs as well as routine

cord blood sampling.  Pitocin was initiated after delivery of the infant with

good uterine contraction.  The placenta was removed by manual extraction.  The

uterus was cleaned with a dry laparotomy tape.  The cervix was opened with the

ring forceps.  The uterine incision was closed with a running lock suture of 0

Polysorb followed by an imbricating layer of 0 Polysorb.  There was an area of

bleeding immediately in the middle of the incision, which was controlled with a

figure-of-eight suture of 0 Polysorb.  The uterine incision was carefully

inspected, it was completely hemostatic.  The tubes and ovaries were inspected,

they appeared normal.  The Trace O  retractor was removed.  The

pericolic gutters and posterior cul-de-sac were cleaned with wet laparotomy

tape.  The uterine incision was again inspected, it remained completely

hemostatic.  Therefore, the rectus muscle and peritoneum were loosely

approximated in the midline using a running mattress suture of 0 Polysorb.  The

posterior aspect of the fascia was inspected, it was hemostatic.  The fascial

incision was closed with a running suture of 0 Polysorb.  The subcutaneous

tissue was irrigated, any areas of bleeding that were noted were cauterized.

The skin was closed with a running subcuticular suture of 3-0 Monocryl followed

by Dermabond.  Final sponge, needle, and instrument counts were reported as

correct.  There were no complications.  Mother and baby are in LDR recovery in

good condition.

 

 

NED / LUCI

DD:  2019 09:20:51

DT:  2019 09:55:22

Job #:  [id number]

## 2019-11-13 RX ADMIN — KETOROLAC TROMETHAMINE SCH MG: 30 INJECTION, SOLUTION INTRAMUSCULAR at 10:29

## 2019-11-13 RX ADMIN — KETOROLAC TROMETHAMINE SCH MG: 30 INJECTION, SOLUTION INTRAMUSCULAR at 04:02

## 2019-11-13 RX ADMIN — OXYCODONE HYDROCHLORIDE AND ACETAMINOPHEN PRN TAB: 5; 325 TABLET ORAL at 15:22

## 2019-11-13 RX ADMIN — OXYCODONE HYDROCHLORIDE AND ACETAMINOPHEN PRN TAB: 5; 325 TABLET ORAL at 21:35

## 2019-11-13 NOTE — [id number]
Post Anesthesia Note





- EVALUATION WITHIN 48HRS OF ANESTHETIC


Vital Signs in Normal Range: Yes


Patient Participated in Evaluation: Yes


Respiratory Function Stable: Yes


Airway Patent: Yes


Cardiovascular Function Stable: Yes


Hydration Status Stable: Yes


Pain Control Satisfactory: Yes


Nausea and Vomiting Control Satisfactory: Yes


Mental Status Recovered: Yes


Vital Signs: 


 Last Vital Signs











Temp  36.6 C   11/13/19 04:17


 


Pulse  90   11/13/19 04:17


 


Resp  16   11/13/19 06:00


 


BP  108/67   11/13/19 04:17


 


Pulse Ox  97   11/13/19 06:00

## 2019-11-13 NOTE — PCM.PNPP
<DawsonMel E - Last Filed: 19 07:42>





- General Info


Date of Service: 19


Admission Dx/Problem (Free Text): 





27 yo G2 now  PPD 1 s/p repeat LTCS


Subjective Update: 





Rima is doing well this AM. She states her pain is being managed appropriately

, but has some concerns about pain below her diaphragm and rib cage which is 

different than her previous c-sections. No complaints otherwise.


Functional Status: Reports: Pain Controlled, Tolerating Diet, Ambulating, 

Urinating.  Denies: New Symptoms, Incentive Spirometry (was set out for use 

upon rounding)


Pain Score: 2





- Review of Systems


General: Reports: Fatigue.  Denies: Fever, Weakness, Chills


HEENT: Denies: Headaches, Visual Changes


Pulmonary: Denies: Shortness of Breath


Cardiovascular: Denies: Chest Pain


Gastrointestinal: Reports: Abdominal Pain (appropriate post- section), 

Flatus, Other (no BM yet).  Denies: Nausea, Vomiting


Genitourinary: Denies: Dysuria


Musculoskeletal: Reports: Other (below diaphragm pain)


Skin: Reports: No Symptoms


Neurological: Denies: Confusion, Dizziness, Headache


Psychiatric: Denies: Confusion





- General Info


Date of Service: 19





- Patient Data


Vital Signs - Most Recent: 


 Last Vital Signs











Temp  36.6 C   19 04:17


 


Pulse  90   19 04:17


 


Resp  16   19 06:00


 


BP  108/67   19 04:17


 


Pulse Ox  97   19 06:00











Weight - Most Recent: 79.379 kg


I&O - Last 24 Hours: 


 Intake & Output











 19





 22:59 06:59 14:59


 


Intake Total 1652 500 


 


Output Total 950 1950 


 


Balance 702 -1450 











Lab Results - Last 24 Hours: 


 Laboratory Results - last 24 hr











  19 Range/Units





  08:48 09:53 06:08 


 


Hgb    9.9 L  (12.0-16.0)  g/dL


 


Hct    30.3 L  (36.0-46.0)  %


 


Cord ABG pH  7.271    (7.18-7.38)  


 


Cord ABG Base Excess  -2    (-10--2)  


 


Cord VBG pH  7.288    (7.25-7.45)  


 


Cord VBG Base Excess  -3    (-10--2)  


 


POC Glucose     ()  mg/dL


 


Fetal Screen   NEGATIVE   (NEGATIVE)  


 


RhIG Candidate?   YES   


 


Rhogam Indicated   YES, BABY RH POS H   














  19 Range/Units





  06:21 


 


Hgb   (12.0-16.0)  g/dL


 


Hct   (36.0-46.0)  %


 


Cord ABG pH   (7.18-7.38)  


 


Cord ABG Base Excess   (-10--2)  


 


Cord VBG pH   (7.25-7.45)  


 


Cord VBG Base Excess   (-10--2)  


 


POC Glucose  82  ()  mg/dL


 


Fetal Screen   (NEGATIVE)  


 


RhIG Candidate?   


 


Rhogam Indicated   











Med Orders - Current: 


 Current Medications





Bisacodyl (Dulcolax)  10 mg RECTAL ONETIME PRN


   PRN Reason: Constipation


Diphenhydramine HCl (Benadryl)  25 mg IVPUSH Q4H PRN


   PRN Reason: Itching


   Stop: 19 09:12


   Last Admin: 19 22:07 Dose:  25 mg


Diphenhydramine HCl (Benadryl)  25 mg IVPUSH Q6H PRN


   PRN Reason: Itching or Nausea


Docusate Sodium (Colace)  100 mg PO BID [facility]


   Last Admin: 19 22:06 Dose:  100 mg


Emollient Ointment (Lansinoh Hpa)  0 gm TOP ASDIRECTED PRN


   PRN Reason: Sore Nipples


Lactated Ringer's (Ringers, Lactated)  1,000 mls @ 125 mls/hr IV ASDIRECTED [facility]


   Last Admin: 19 13:20 Dose:  125 mls/hr


Ibuprofen (Motrin)  800 mg PO Q8H PRN


   PRN Reason: mild pain or fever


Ketorolac Tromethamine (Toradol)  30 mg IVPUSH Q6H [facility]


   Stop: 19 09:16


   Last Admin: 19 04:02 Dose:  30 mg


Methylergonovine Maleate (Methergine)  0.2 mg IM ONETIME PRN


   PRN Reason: Excessive Vaginal Bleeding


Naloxone HCl (Narcan)  0.1 mg IVPUSH ONETIME PRN


   PRN Reason: Respiratory Depression


   Stop: 19 09:12


Ondansetron HCl (Zofran)  4 mg IVPUSH Q4H PRN


   PRN Reason: Nausea/Vomiting


   Last Admin: 19 08:45 Dose:  4 mg


Oxycodone/Acetaminophen (Percocet 325-5 Mg)  1 tab PO Q4H PRN


   PRN Reason: Pain (moderate 4-6)


Oxycodone/Acetaminophen (Percocet 325-5 Mg)  2 tab PO Q4H PRN


   PRN Reason: Pain (moderate 4-6)





Discontinued Medications





Cefazolin Sodium (Ancef) Confirm Administered Dose 2 gm .ROUTE .STK-MED ONE


   Stop: 19 08:22


Citric Acid/Sodium Citrate (Bicitra Solution)  30 ml PO ONETIME ONE


   Stop: 19 05:09


   Last Admin: 19 08:06 Dose:  30 ml


Citric Acid/Sodium Citrate (Bicitra Solution) Confirm Administered Dose 30 ml 

.ROUTE .STK-MED ONE


   Stop: 19 08:04


Fentanyl (Sublimaze) Confirm Administered Dose 100 mcg .ROUTE .STK-MED ONE


   Stop: 19 08:56


Cefazolin Sodium/Dextrose 2 gm (/ Premix)  50 mls @ 100 mls/hr IV ONETIME ONE


   Stop: 19 05:37


   Last Admin: 19 19:18 Dose:  Not Given


Lactated Ringer's (Ringers, Lactated)  1,000 mls @ 500 mls/hr IV BOLUS TARAN


   Last Admin: 19 08:06 Dose:  500 mls/hr


Oxytocin/Sodium Chloride (Oxytocin 30 Unit/500 Ml-Ns)  30 unit in 500 mls @ 250 

mls/hr IV TITRATE TARAN


Sodium Chloride (Normal Saline) Confirm Administered Dose 20 mls @ as directed 

.ROUTE .STK-MED ONE


   Stop: 19 08:22


Midazolam HCl (Versed 1 Mg/Ml) Confirm Administered Dose 2 mg .ROUTE .STK-MED 

ONE


   Stop: 19 08:47


Morphine Sulfate (Duramorph Pf) Confirm Administered Dose 10 mg .ROUTE .STK-MED 

ONE


   Stop: 19 07:13


Octyl Cyanoacrylate (Dermabond Advance) Confirm Administered Dose 1 applic 

.ROUTE .STK-MED ONE


   Stop: 19 07:46


Ondansetron HCl (Zofran) Confirm Administered Dose 4 mg .ROUTE .STK-MED ONE


   Stop: 19 07:24


Oxytocin (Pitocin) Confirm Administered Dose 30 unit .ROUTE .STK-MED ONE


   Stop: 19 07:24


Sodium Chloride (Saline Flush)  10 ml FLUSH ASDIRECTED PRN


   PRN Reason: Keep Vein Open


Sodium Chloride (Saline Flush)  2.5 ml FLUSH ASDIRECTED PRN


   PRN Reason: Keep Vein Open


Sodium Chloride (Normal Saline)  10 ml IV ASDIRECTED PRN


   PRN Reason: IV Use











- Infant Interaction


Infant Disposition, Postpartum: [place] in Room with Family


Infant Interaction: Holding Infant


Infant Feeding:  Infant; Nursed Well


Support Person: 





- Postpartum Recovery Exam


Fundal Tone: Firm


Fundal Level: 1 Fingerbreadths Below Umbilicus


Fundal Placement: Midline


Lochia Amount: Scant


Lochia Color: Rubra/Red


Perineum Description: Intact, Minimal Bruising/Swelling


Episiotomy/Laceration: None


Bladder Status: Voiding





- Exam


General: Alert, Oriented, Cooperative


HEENT: Pupils Equal, Pupils Reactive, EOMI, Mucous Membr. Moist/Pink


Lungs: Clear to Auscultation, Normal Respiratory Effort


Cardiovascular: Regular Rate, Regular Rhythm


GI/Abdominal Exam: Soft, No Distention, No Mass, Tender (to uterine palpation 

and at incision site)


Extremities: Normal Inspection, Normal Range of Motion, Non-Tender, No Pedal 

Edema, Normal Capillary Refill


Skin: Warm, Dry, Intact


Wound/Incisions: Healing Well


Neurological: No New Focal Deficit


Psy/Mental Status: Alert, Normal Affect, Normal Mood





- Problem List & Annotations


(1)  delivery delivered


SNOMED Code(s): 388836544


   Code(s): O82 - ENCOUNTER FOR  DELIVERY WITHOUT INDICATION   Status: 

Resolved   Current Visit: Yes   Onset Date: ~19   





- Problem List Review


Problem List Initiated/Reviewed/Updated: Yes





- Assessment


Assessment:: 





Rima Kinney is a 35 yo G3 now  post-op day 1 s/p repeat LTCS at 39 wks. 

PNC complicated by gestational diabetes managed with insulin, AMA, and prior c-

section. She is hemodynamically stable, lochia appropriate and pain tolerable.





- Plan


Plan:: 





Continue post-partum cares to include vital signs, pain relief with Toradol as 

ordered, ambulation, hydration, and mother- bonding. Regular diet as 

tolerated.





<Randi Tripp - Last Filed: 19 08:13>





- Patient Data


Vital Signs - Most Recent: 


 Last Vital Signs











Temp  36.6 C   19 04:17


 


Pulse  90   19 04:17


 


Resp  16   19 06:00


 


BP  108/67   19 04:17


 


Pulse Ox  97   19 06:00











I&O - Last 24 Hours: 


 Intake & Output











 19





 22:59 06:59 14:59


 


Intake Total 1652 500 


 


Output Total 950 1950 


 


Balance 702 -1450 











Lab Results - Last 24 Hours: 


 Laboratory Results - last 24 hr











  19 Range/Units





  08:48 09:53 06:08 


 


Hgb    9.9 L  (12.0-16.0)  g/dL


 


Hct    30.3 L  (36.0-46.0)  %


 


Cord ABG pH  7.271    (7.18-7.38)  


 


Cord ABG Base Excess  -2    (-10--2)  


 


Cord VBG pH  7.288    (7.25-7.45)  


 


Cord VBG Base Excess  -3    (-10--2)  


 


POC Glucose     ()  mg/dL


 


Fetal Screen   NEGATIVE   (NEGATIVE)  


 


RhIG Candidate?   YES   


 


Rhogam Indicated   YES, BABY RH POS H   














  19 Range/Units





  06:21 


 


Hgb   (12.0-16.0)  g/dL


 


Hct   (36.0-46.0)  %


 


Cord ABG pH   (7.18-7.38)  


 


Cord ABG Base Excess   (-10--2)  


 


Cord VBG pH   (7.25-7.45)  


 


Cord VBG Base Excess   (-10--2)  


 


POC Glucose  82  ()  mg/dL


 


Fetal Screen   (NEGATIVE)  


 


RhIG Candidate?   


 


Rhogam Indicated   











Med Orders - Current: 


 Current Medications





Bisacodyl (Dulcolax)  10 mg RECTAL ONETIME PRN


   PRN Reason: Constipation


Diphenhydramine HCl (Benadryl)  25 mg IVPUSH Q4H PRN


   PRN Reason: Itching


   Stop: 19 09:12


   Last Admin: 19 22:07 Dose:  25 mg


Diphenhydramine HCl (Benadryl)  25 mg IVPUSH Q6H PRN


   PRN Reason: Itching or Nausea


Docusate Sodium (Colace)  100 mg PO BID [facility]


   Last Admin: 19 22:06 Dose:  100 mg


Emollient Ointment (Lansinoh Hpa)  0 gm TOP ASDIRECTED PRN


   PRN Reason: Sore Nipples


Lactated Ringer's (Ringers, Lactated)  1,000 mls @ 125 mls/hr IV ASDIRECTED [facility]


   Last Admin: 19 13:20 Dose:  125 mls/hr


Ibuprofen (Motrin)  800 mg PO Q8H PRN


   PRN Reason: mild pain or fever


Ketorolac Tromethamine (Toradol)  30 mg IVPUSH Q6H [facility]


   Stop: 19 09:16


   Last Admin: 19 04:02 Dose:  30 mg


Methylergonovine Maleate (Methergine)  0.2 mg IM ONETIME PRN


   PRN Reason: Excessive Vaginal Bleeding


Naloxone HCl (Narcan)  0.1 mg IVPUSH ONETIME PRN


   PRN Reason: Respiratory Depression


   Stop: 19 09:12


Ondansetron HCl (Zofran)  4 mg IVPUSH Q4H PRN


   PRN Reason: Nausea/Vomiting


   Last Admin: 19 08:45 Dose:  4 mg


Oxycodone/Acetaminophen (Percocet 325-5 Mg)  1 tab PO Q4H PRN


   PRN Reason: Pain (moderate 4-6)


Oxycodone/Acetaminophen (Percocet 325-5 Mg)  2 tab PO Q4H PRN


   PRN Reason: Pain (moderate 4-6)





Discontinued Medications





Cefazolin Sodium (Ancef) Confirm Administered Dose 2 gm .ROUTE .STK-MED ONE


   Stop: 19 08:22


Citric Acid/Sodium Citrate (Bicitra Solution)  30 ml PO ONETIME ONE


   Stop: 19 05:09


   Last Admin: 19 08:06 Dose:  30 ml


Citric Acid/Sodium Citrate (Bicitra Solution) Confirm Administered Dose 30 ml 

.ROUTE .STK-MED ONE


   Stop: 19 08:04


Fentanyl (Sublimaze) Confirm Administered Dose 100 mcg .ROUTE .STK-MED ONE


   Stop: 19 08:56


Cefazolin Sodium/Dextrose 2 gm (/ Premix)  50 mls @ 100 mls/hr IV ONETIME ONE


   Stop: 19 05:37


   Last Admin: 19 19:18 Dose:  Not Given


Lactated Ringer's (Ringers, Lactated)  1,000 mls @ 500 mls/hr IV BOLUS TARAN


   Last Admin: 19 08:06 Dose:  500 mls/hr


Oxytocin/Sodium Chloride (Oxytocin 30 Unit/500 Ml-Ns)  30 unit in 500 mls @ 250 

mls/hr IV TITRATE TARAN


Sodium Chloride (Normal Saline) Confirm Administered Dose 20 mls @ as directed 

.ROUTE .STK-MED ONE


   Stop: 19 08:22


Midazolam HCl (Versed 1 Mg/Ml) Confirm Administered Dose 2 mg .ROUTE .STK-MED 

ONE


   Stop: 19 08:47


Morphine Sulfate (Duramorph Pf) Confirm Administered Dose 10 mg .ROUTE .STK-MED 

ONE


   Stop: 19 07:13


Octyl Cyanoacrylate (Dermabond Advance) Confirm Administered Dose 1 applic 

.ROUTE .STK-MED ONE


   Stop: 19 07:46


Ondansetron HCl (Zofran) Confirm Administered Dose 4 mg .ROUTE .STK-MED ONE


   Stop: 19 07:24


Oxytocin (Pitocin) Confirm Administered Dose 30 unit .ROUTE .STK-MED ONE


   Stop: 19 07:24


Sodium Chloride (Saline Flush)  10 ml FLUSH ASDIRECTED PRN


   PRN Reason: Keep Vein Open


Sodium Chloride (Saline Flush)  2.5 ml FLUSH ASDIRECTED PRN


   PRN Reason: Keep Vein Open


Sodium Chloride (Normal Saline)  10 ml IV ASDIRECTED PRN


   PRN Reason: IV Use











- Problem List & Annotations


(1)  delivery delivered


SNOMED Code(s): 772455955


   Code(s): O82 - ENCOUNTER FOR  DELIVERY WITHOUT INDICATION   Status: 

Resolved   Current Visit: Yes   Onset Date: ~19   





(2) Gestational diabetes mellitus (GDM) in childbirth, insulin controlled


SNOMED Code(s): 35500556


   Code(s): O24.424 - GESTATIONAL DIABETES IN CHILDBIRTH, INSULIN CONTROLLED   

Status: Acute   Current Visit: No   





- Problem List Review


Problem List Initiated/Reviewed/Updated: Yes





- My Orders


Last 24 Hours: 


My Active Orders





19 09:11


Patient Status [ADT] Routine 


Ambulate [RC] PER UNIT ROUTINE 


Antiembolic Devices [RC] PER UNIT ROUTINE 


Communication Order [RC] PER UNIT ROUTINE 


May Shower [RC] ASDIRECTED 


RT Incentive Spirometry [RC] Q2HWA 


Acetaminophen/oxyCODONE [Percocet 325-5 MG]   1 tab PO Q4H PRN 


Acetaminophen/oxyCODONE [Percocet 325-5 MG]   2 tab PO Q4H PRN 


Bisacodyl [Dulcolax]   10 mg RECTAL ONETIME PRN 


Ibuprofen [Motrin]   800 mg PO Q8H PRN 


Lanolin [Lansinoh HPA]   See Dose Instructions  TOP ASDIRECTED PRN 


Methylergonovine [Methergine]   0.2 mg IM ONETIME PRN 


Ondansetron [Zofran]   4 mg IVPUSH Q4H PRN 


diphenhydrAMINE [Benadryl]   25 mg IVPUSH Q6H PRN 


Abdominal Binder [OM.PC] Urgent 


Assess Lochia [WOMSER] Per Unit Routine 


Assess Uterine Involution [WOMSER] Per Unit Routine 


Breast Pump [WOMSER] Per Unit Routine 


Peripheral IV Discontinue [OM.PC] Routine 


Sequential Compression Device [OM.PC] Per Unit Routine 


Resuscitation Status Routine 





19 09:12


Notify Provider Intake and Out [RC] ASDIRECTED 


Notify Provider Vital Signs [RC] ASDIRECTED 





19 09:14


Intake and Output [RC] Q8H 





19 09:15


Ketorolac [Toradol]   30 mg IVPUSH Q6H 


Lactated Ringers [Ringers, Lactated] 1,000 ml IV ASDIRECTED 





19 21:00


Docusate Sodium [Colace]   100 mg PO BID 





19 Dinner


Regular Diet [DIET] 














- Assessment


Assessment:: 





patient was seen and examined by me and I agree with above.

## 2019-11-14 VITALS — SYSTOLIC BLOOD PRESSURE: 117 MMHG | HEART RATE: 93 BPM | DIASTOLIC BLOOD PRESSURE: 73 MMHG

## 2019-11-14 RX ADMIN — OXYCODONE HYDROCHLORIDE AND ACETAMINOPHEN PRN TAB: 5; 325 TABLET ORAL at 08:25

## 2019-11-14 RX ADMIN — OXYCODONE HYDROCHLORIDE AND ACETAMINOPHEN PRN TAB: 5; 325 TABLET ORAL at 13:19

## 2019-11-14 NOTE — PCM.PNPP
<DawsonMel E - Last Filed: 19 07:50>





- General Info


Date of Service: 19


Admission Dx/Problem (Free Text): 





29 yo G2 now  PPD 2 s/p repeat LTCS


Subjective Update: 





Rima is doing well this AM. She states her pain is being managed appropriately 

and she is ready to be home. No complaints otherwise.


Functional Status: Reports: Pain Controlled, Tolerating Diet, Ambulating, 

Urinating, Incentive Spirometry.  Denies: New Symptoms





- Review of Systems


General: Reports: Fatigue.  Denies: Fever, Weakness, Chills


HEENT: Denies: Headaches, Visual Changes


Pulmonary: Denies: Shortness of Breath


Cardiovascular: Denies: Chest Pain


Gastrointestinal: Denies: Abdominal Pain


Genitourinary: Denies: Dysuria


Musculoskeletal: Reports: No Symptoms


Skin: Denies: No Symptoms


Neurological: Denies: Confusion, Dizziness, Headache, Numbness, Tingling


Psychiatric: Denies: Confusion





- General Info


Date of Service: 19





- Patient Data


Vital Signs - Most Recent: 


 Last Vital Signs











Temp  36.6 C   19 04:05


 


Pulse  87   19 04:05


 


Resp  16   19 04:05


 


BP  112/64   19 04:05


 


Pulse Ox  97   19 04:05











Weight - Most Recent: 79.379 kg


Med Orders - Current: 


 Current Medications





Bisacodyl (Dulcolax)  10 mg RECTAL ONETIME PRN


   PRN Reason: Constipation


Diphenhydramine HCl (Benadryl)  25 mg IVPUSH Q6H PRN


   PRN Reason: Itching or Nausea


Docusate Sodium (Colace)  100 mg PO BID [facility]


   Last Admin: 19 20:04 Dose:  100 mg


Emollient Ointment (Lansinoh Hpa)  0 gm TOP ASDIRECTED PRN


   PRN Reason: Sore Nipples


Lactated Ringer's (Ringers, Lactated)  1,000 mls @ 125 mls/hr IV ASDIRECTED [facility]


   Last Admin: 19 13:20 Dose:  125 mls/hr


Ibuprofen (Motrin)  800 mg PO Q8H PRN


   PRN Reason: mild pain or fever


   Last Admin: 19 04:07 Dose:  800 mg


Methylergonovine Maleate (Methergine)  0.2 mg IM ONETIME PRN


   PRN Reason: Excessive Vaginal Bleeding


Ondansetron HCl (Zofran)  4 mg IVPUSH Q4H PRN


   PRN Reason: Nausea/Vomiting


   Last Admin: 19 08:45 Dose:  4 mg


Oxycodone/Acetaminophen (Percocet 325-5 Mg)  1 tab PO Q4H PRN


   PRN Reason: Pain (moderate 4-6)


   Last Admin: 19 01:26 Dose:  1 tab


Oxycodone/Acetaminophen (Percocet 325-5 Mg)  2 tab PO Q4H PRN


   PRN Reason: Pain (moderate 4-6)


   Last Admin: 19 21:35 Dose:  2 tab





Discontinued Medications





Cefazolin Sodium (Ancef) Confirm Administered Dose 2 gm .ROUTE .STK-MED ONE


   Stop: 19 08:22


Citric Acid/Sodium Citrate (Bicitra Solution)  30 ml PO ONETIME ONE


   Stop: 19 05:09


   Last Admin: 19 08:06 Dose:  30 ml


Citric Acid/Sodium Citrate (Bicitra Solution) Confirm Administered Dose 30 ml 

.ROUTE .STK-MED ONE


   Stop: 19 08:04


Diphenhydramine HCl (Benadryl)  25 mg IVPUSH Q4H PRN


   PRN Reason: Itching


   Stop: 19 09:12


   Last Admin: 19 22:07 Dose:  25 mg


Fentanyl (Sublimaze) Confirm Administered Dose 100 mcg .ROUTE .STK-MED ONE


   Stop: 19 08:56


Cefazolin Sodium/Dextrose 2 gm (/ Premix)  50 mls @ 100 mls/hr IV ONETIME ONE


   Stop: 19 05:37


   Last Admin: 19 19:18 Dose:  Not Given


Lactated Ringer's (Ringers, Lactated)  1,000 mls @ 500 mls/hr IV BOLUS TARAN


   Last Admin: 19 08:06 Dose:  500 mls/hr


Oxytocin/Sodium Chloride (Oxytocin 30 Unit/500 Ml-Ns)  30 unit in 500 mls @ 250 

mls/hr IV TITRATE TARAN


Sodium Chloride (Normal Saline) Confirm Administered Dose 20 mls @ as directed 

.ROUTE .STK-MED ONE


   Stop: 19 08:22


Ketorolac Tromethamine (Toradol)  30 mg IVPUSH Q6H TARAN


   Stop: 19 09:16


   Last Admin: 19 10:29 Dose:  30 mg


Midazolam HCl (Versed 1 Mg/Ml) Confirm Administered Dose 2 mg .ROUTE .STK-MED 

ONE


   Stop: 19 08:47


Morphine Sulfate (Duramorph Pf) Confirm Administered Dose 10 mg .ROUTE .STK-MED 

ONE


   Stop: 19 07:13


Naloxone HCl (Narcan)  0.1 mg IVPUSH ONETIME PRN


   PRN Reason: Respiratory Depression


   Stop: 19 09:12


Octyl Cyanoacrylate (Dermabond Advance) Confirm Administered Dose 1 applic 

.ROUTE .STK-MED ONE


   Stop: 19 07:46


Ondansetron HCl (Zofran) Confirm Administered Dose 4 mg .ROUTE .STK-MED ONE


   Stop: 19 07:24


Oxytocin (Pitocin) Confirm Administered Dose 30 unit .ROUTE .STK-MED ONE


   Stop: 19 07:24


Sodium Chloride (Saline Flush)  10 ml FLUSH ASDIRECTED PRN


   PRN Reason: Keep Vein Open


Sodium Chloride (Saline Flush)  2.5 ml FLUSH ASDIRECTED PRN


   PRN Reason: Keep Vein Open


Sodium Chloride (Normal Saline)  10 ml IV ASDIRECTED PRN


   PRN Reason: IV Use











- Infant Interaction


Infant Disposition, Postpartum: [place] in Room with Family


Infant Interaction: Other (see below) (baby sleeping in bassinet)


Infant Feeding:  Infant; Nursed Well


Support Person: 





- Postpartum Recovery Exam


Fundal Tone: Firm


Fundal Level: 2 Fingerbreadths Below Umbilicus


Fundal Placement: Midline


Lochia Amount: Scant


Lochia Color: Rubra/Red


Perineum Description: Intact, Minimal Bruising/Swelling


Episiotomy/Laceration: None


Bladder Status: Voiding


Urinary Elimination: Voided





- Exam


General: Alert, Oriented


HEENT: Pupils Equal, Pupils Reactive, EOMI, Mucous Membr. Moist/Pink


Lungs: Clear to Auscultation, Normal Respiratory Effort


Cardiovascular: Regular Rate, Regular Rhythm


GI/Abdominal Exam: Normal Bowel Sounds, Soft, No Distention, Other (tenderness 

to palpation near incision site)


Extremities: Normal Inspection, Normal Range of Motion, Non-Tender, No Pedal 

Edema, Normal Capillary Refill


Skin: Warm, Dry, Intact


Wound/Incisions: Healing Well


Neurological: No New Focal Deficit


Psy/Mental Status: Alert, Normal Affect, Normal Mood





- Problem List & Annotations


(1)  delivery delivered


SNOMED Code(s): 158290268


   Code(s): O82 - ENCOUNTER FOR  DELIVERY WITHOUT INDICATION   Status: 

Resolved   Current Visit: Yes   Onset Date: ~19   





(2) Gestational diabetes mellitus (GDM) in childbirth, insulin controlled


SNOMED Code(s): 73603210


   Code(s): O24.424 - GESTATIONAL DIABETES IN CHILDBIRTH, INSULIN CONTROLLED   

Status: Acute   Current Visit: Yes   





- Problem List Review


Problem List Initiated/Reviewed/Updated: Yes





- Assessment


Assessment:: 





Rima Kinney is a 33 yo G3 now  post-op day 2 s/p repeat LTCS at 39 wks. 

PNC complicated by gestational diabetes managed with insulin, AMA, and prior c-

section. She is hemodynamically stable, lochia appropriate and pain tolerable. 





- Plan


Plan:: 





Plan to discharge today.


Return to care precautions given to include fever over 101F, heavy vaginal 

bleeding that is soaking a pad every hour, increased abdominal pain, SOB, chest 

pain, or a hard, red, swollen portion of leg(s).


Ambulation, good nutrition and hydration, and mother- bonding 

encouraged. 


Pt to be sent home with Percocet for pain control but also discussed scheduling 

Motrin in there for the first few days home. If not taking Percocet, 

appropriate to take Tylenol with the Motrin.


F/U with Dr. Tripp in clinic in 2 weeks.





<Randi Tripp - Last Filed: 19 09:00>





- Patient Data


Vital Signs - Most Recent: 


 Last Vital Signs











Temp  36.4 C   19 08:00


 


Pulse  93   19 08:00


 


Resp  18   19 08:00


 


BP  117/73   19 08:00


 


Pulse Ox  97   19 08:00











Med Orders - Current: 


 Current Medications





Bisacodyl (Dulcolax)  10 mg RECTAL ONETIME PRN


   PRN Reason: Constipation


Diphenhydramine HCl (Benadryl)  25 mg IVPUSH Q6H PRN


   PRN Reason: Itching or Nausea


Docusate Sodium (Colace)  100 mg PO BID [facility]


   Last Admin: 19 08:27 Dose:  100 mg


Emollient Ointment (Lansinoh Hpa)  0 gm TOP ASDIRECTED PRN


   PRN Reason: Sore Nipples


Lactated Ringer's (Ringers, Lactated)  1,000 mls @ 125 mls/hr IV ASDIRECTED [facility]


   Last Admin: 19 13:20 Dose:  125 mls/hr


Ibuprofen (Motrin)  800 mg PO Q8H PRN


   PRN Reason: mild pain or fever


   Last Admin: 19 04:07 Dose:  800 mg


Methylergonovine Maleate (Methergine)  0.2 mg IM ONETIME PRN


   PRN Reason: Excessive Vaginal Bleeding


Ondansetron HCl (Zofran)  4 mg IVPUSH Q4H PRN


   PRN Reason: Nausea/Vomiting


   Last Admin: 19 08:45 Dose:  4 mg


Oxycodone/Acetaminophen (Percocet 325-5 Mg)  1 tab PO Q4H PRN


   PRN Reason: Pain (moderate 4-6)


   Last Admin: 19 01:26 Dose:  1 tab


Oxycodone/Acetaminophen (Percocet 325-5 Mg)  2 tab PO Q4H PRN


   PRN Reason: Pain (moderate 4-6)


   Last Admin: 19 08:25 Dose:  2 tab





Discontinued Medications





Cefazolin Sodium (Ancef) Confirm Administered Dose 2 gm .ROUTE .STK-MED ONE


   Stop: 19 08:22


Citric Acid/Sodium Citrate (Bicitra Solution)  30 ml PO ONETIME ONE


   Stop: 19 05:09


   Last Admin: 19 08:06 Dose:  30 ml


Citric Acid/Sodium Citrate (Bicitra Solution) Confirm Administered Dose 30 ml 

.ROUTE .STK-MED ONE


   Stop: 19 08:04


Diphenhydramine HCl (Benadryl)  25 mg IVPUSH Q4H PRN


   PRN Reason: Itching


   Stop: 19 09:12


   Last Admin: 19 22:07 Dose:  25 mg


Fentanyl (Sublimaze) Confirm Administered Dose 100 mcg .ROUTE .STK-MED ONE


   Stop: 19 08:56


Cefazolin Sodium/Dextrose 2 gm (/ Premix)  50 mls @ 100 mls/hr IV ONETIME ONE


   Stop: 19 05:37


   Last Admin: 19 19:18 Dose:  Not Given


Lactated Ringer's (Ringers, Lactated)  1,000 mls @ 500 mls/hr IV BOLUS TARAN


   Last Admin: 19 08:06 Dose:  500 mls/hr


Oxytocin/Sodium Chloride (Oxytocin 30 Unit/500 Ml-Ns)  30 unit in 500 mls @ 250 

mls/hr IV TITRATE [facility]


Sodium Chloride (Normal Saline) Confirm Administered Dose 20 mls @ as directed 

.ROUTE .STK-MED ONE


   Stop: 19 08:22


Ketorolac Tromethamine (Toradol)  30 mg IVPUSH Q6H [facility]


   Stop: 19 09:16


   Last Admin: 19 10:29 Dose:  30 mg


Midazolam HCl (Versed 1 Mg/Ml) Confirm Administered Dose 2 mg .ROUTE .STK-MED 

ONE


   Stop: 19 08:47


Morphine Sulfate (Duramorph Pf) Confirm Administered Dose 10 mg .ROUTE .STK-MED 

ONE


   Stop: 19 07:13


Naloxone HCl (Narcan)  0.1 mg IVPUSH ONETIME PRN


   PRN Reason: Respiratory Depression


   Stop: 19 09:12


Octyl Cyanoacrylate (Dermabond Advance) Confirm Administered Dose 1 applic 

.ROUTE .STK-MED ONE


   Stop: 19 07:46


Ondansetron HCl (Zofran) Confirm Administered Dose 4 mg .ROUTE .STK-MED ONE


   Stop: 19 07:24


Oxytocin (Pitocin) Confirm Administered Dose 30 unit .ROUTE .STK-MED ONE


   Stop: 19 07:24


Sodium Chloride (Saline Flush)  10 ml FLUSH ASDIRECTED PRN


   PRN Reason: Keep Vein Open


Sodium Chloride (Saline Flush)  2.5 ml FLUSH ASDIRECTED PRN


   PRN Reason: Keep Vein Open


Sodium Chloride (Normal Saline)  10 ml IV ASDIRECTED PRN


   PRN Reason: IV Use











- Problem List & Annotations


(1)  delivery delivered


SNOMED Code(s): 217225756


   Code(s): O82 - ENCOUNTER FOR  DELIVERY WITHOUT INDICATION   Status: 

Resolved   Current Visit: Yes   Onset Date: ~19   





(2) Gestational diabetes mellitus (GDM) in childbirth, insulin controlled


SNOMED Code(s): 68079041


   Code(s): O24.424 - GESTATIONAL DIABETES IN CHILDBIRTH, INSULIN CONTROLLED   

Status: Acute   Current Visit: Yes   





- Problem List Review


Problem List Initiated/Reviewed/Updated: Yes





- Plan


Plan:: 





Patient was seen and examined by me and I agree with above.

## 2019-12-02 ENCOUNTER — HOSPITAL ENCOUNTER (EMERGENCY)
Dept: HOSPITAL 56 - MW.ED | Age: 34
Discharge: HOME | End: 2019-12-02
Payer: COMMERCIAL

## 2019-12-02 VITALS — HEART RATE: 117 BPM | SYSTOLIC BLOOD PRESSURE: 120 MMHG | DIASTOLIC BLOOD PRESSURE: 87 MMHG
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
## 2019-12-02 DIAGNOSIS — Z88.8: ICD-10-CM

## 2019-12-02 DIAGNOSIS — S52.102A: Primary | ICD-10-CM

## 2019-12-02 DIAGNOSIS — Z88.2: ICD-10-CM

## 2019-12-02 DIAGNOSIS — Z88.0: ICD-10-CM

## 2019-12-02 NOTE — EDM.PDOC
ED HPI GENERAL MEDICAL PROBLEM





- General


Chief Complaint: Upper Extremity Injury/Pain


Stated Complaint: SLIPPED ON ICE AND HURT ARM


Time Seen by Provider: 19 17:01


Source of Information: Reports: Patient


History Limitations: Reports: No Limitations





- History of Present Illness


INITIAL COMMENTS - FREE TEXT/NARRATIVE: 





HISTORY AND PHYSICAL:





History of present illness:


Patient is a 34-year-old female presents to the ED with complaint of left elbow 

injury. She states she fell on the ice landing on her left side just prior to 

arrival to the ED. She reports pain in her elbow with movement. she denies head 

injury and has no other complaints. 





Review of systems: 


As per history of present illness and below otherwise all systems reviewed and 

negative.





Past medical history: 


As per history of present illness and as reviewed below otherwise 

noncontributory.





Surgical history: 


As per history of present illness and as reviewed below otherwise 

noncontributory.





Social history: 


No reported history of drug or alcohol abuse.





Family history: 


As per history of present illness and as reviewed below otherwise 

noncontributory.





Physical exam:


General: Patient sitting comfortably in no acute distress and nontoxic appearing


HEENT: Atraumatic, normocephalic, pupils reactive, negative for conjunctival 

pallor or scleral icterus, mucous membranes moist, throat clear, neck supple, 

nontender, trachea midline. No meningeal signs. 


Lungs: Clear to auscultation, breath sounds equal bilaterally, chest nontender.


Heart: S1S2, regular, negative for clicks, rubs, or overt murmur.


Abdomen: Soft, nondistended, nontender. Negative for masses or 

hepatosplenomegaly. Negative for costovertebral tenderness. No rigidity, rebound

, guarding.


Pelvis: Stable nontender.


Genitourinary: Deferred.


Rectal: Deferred.


Extremities: No obvious swelling or deformity. Pain to palpation of proximal 

radius and ulna.  Atraumatic, negative for cords or calf pain. Neurovascular 

unremarkable.


Neuro: Awake, alert, oriented. Cranial nerves II through XII unremarkable. 

Cerebellum unremarkable. Motor and sensory unremarkable throughout. Exam 

nonfocal.





Notes: 





Diagnostics:


x-ray left elbow 





Therapeutics:


Custom long arm splint placed by nursing 





Prescriptions:








Impression: 


Left arm injury, proximal radius fracture 








Definitive disposition and diagnosis as appropriate pending reevaluation and 

review of above.








- Related Data


 Allergies











Allergy/AdvReac Type Severity Reaction Status Date / Time


 


nalbuphine [From Nubain] Allergy  Hallucinati Verified 19 07:34





   ons  


 


penicillin G Allergy  Rash Verified 19 14:11


 


Sulfa (Sulfonamide Allergy  Rash Verified 19 14:11





Antibiotics)     











Home Meds: 


 Home Meds





Pnv No.95/Ferrous Fum/Folic AC [Prenatal Vitamin Tablet] 1 tab PO DAILY  [History]











Past Medical History


HEENT History: Reports: Other (See Below)


Other HEENT History: wears glasses


Cardiovascular History: Reports: None


Respiratory History: Reports: None


Gastrointestinal History: Reports: GERD


Genitourinary History: Reports: None


OB/GYN History: Reports: None, Pregnancy


Musculoskeletal History: Reports: None


Neurological History: Reports: None


Psychiatric History: Reports: None


Endocrine/Metabolic History: Reports: Diabetes, Gestational


Hematologic History: Reports: Anemia


Immunologic History: Reports: None


Oncologic (Cancer) History: Reports: None


Dermatologic History: Reports: None





- Infectious Disease History


Infectious Disease History: Reports: Chicken Pox





- Past Surgical History


Head Surgeries/Procedures: Reports: None


HEENT Surgical History: Reports: None


Cardiovascular Surgical History: Reports: None


Respiratory Surgical History: Reports: None


GI Surgical History: Reports: None


Female  Surgical History: Reports:  Section


Endocrine Surgical History: Reports: None


Neurological Surgical History: Reports: None


Musculoskeletal Surgical History: Reports: None


Oncologic Surgical History: Reports: None


Dermatological Surgical History: Reports: None





Social & Family History





- Family History


Family Medical History: Noncontributory


Respiratory: Reports: COPD


GI: Reports: None, Other (See Below)


Other GI Family History: crohns


Endocrine/Metabolic: Reports: Diabetes, type II


Immunologic: Reports: None


Oncologic: Reports: Thyroid





- Tobacco Use


Smoking Status *Q: Never Smoker





- Caffeine Use


Caffeine Use: Reports: Coffee





- Recreational Drug Use


Recreational Drug Use: No





Review of Systems





- Review of Systems


Review Of Systems: Comprehensive ROS is negative, except as noted in HPI.





ED EXAM, GENERAL





- Physical Exam


Exam: See Below (see dictation)





Course





- Vital Signs


Last Recorded V/S: 


 Last Vital Signs











Temp      


 


Pulse  117 H  19 16:52


 


Resp  18   19 16:52


 


BP  120/87   19 16:52


 


Pulse Ox  100   19 16:52














Departure





- Departure


Time of Disposition: 17:42


Disposition: Home, Self-Care 01


Condition: Good


Clinical Impression: 


 Injury of left elbow, Fracture, radius, proximal








- Discharge Information


Referrals: 


Johnnie Marrero MD [Primary Care Provider] - 


Forms:  ED Department Discharge


Additional Instructions: 


The following information is given to patients seen in the emergency department 

who are being discharged to home. This information is to outline your options 

for follow-up care. We provide all patients seen in our emergency department 

with a follow-up referral.





The need for follow-up, as well as the timing and circumstances, are variable 

depending upon the specifics of your emergency department visit.





If you don't have a primary care physician on staff, we will provide you with a 

referral. We always advise you to contact your personal physician following an 

emergency department visit to inform them of the circumstance of the visit and 

for follow-up with them and/or the need for any referrals to a consulting 

specialist.





The emergency department will also refer you to a specialist when appropriate. 

This referral assures that you have the opportunity for follow-up care with a 

specialist. All of these measure are taken in an effort to provide you with 

optimal care, which includes your follow-up.





Under all circumstances we always encourage you to contact your private 

physician who remains a resource for coordinating your care. When calling for 

follow-up care, please make the office aware that this follow-up is from your 

recent emergency room visit. If for any reason you are refused follow-up, 

please contact the [facility] Emergency 

Department at [phone] and asked to speak to the emergency department 

charge nurse.





[facility]


Specialty Care - Orthopedic Clinic


 Professional [address], [address]


[place] [address]


Phone: [phone]





1. Ice, elevate, and motrin or tylenol as needed


2. Follow up with orthopedics, please call the number provided to schedule an 

appointment


3. Return to ED as needed as discussed

## 2019-12-02 NOTE — CR
Indication:



Fall.



Technique:



Three views of the left elbow.



Comparison:



None



Findings:



A fracture of the proximal radius is identified. This is a nondisplaced 

fracture predominantly involving the radial neck. A small joint effusion is 

identified. No other fractures are identified.



Impression:



Proximal radial fracture



Dictated by Faith Walters MD @ Dec  2 2019  5:37PM



Signed by Dr. Faith Walters @ Dec  2 2019  5:37PM

## 2021-03-18 NOTE — PCM.OPNOTE
- General Post-Op/Procedure Note


Date of Surgery/Procedure: 21


Operative Procedure(s): Repeat low transverse  section


Findings: 


Normal appearing uterus, fallopian tubes and ovaries. Moderate omental adhesions

to the rectus muscle, bladder adhesions to the lower uterine segment.  


Pre Op Diagnosis: [place] intrauterine pregnancy at 38w0d.  Prior  

section x3.  Insulin-controlled Gestational Diabetes.  Early labor


Post-Op Diagnosis: Same as preoperative


Anesthesia Technique: Spinal


Primary Surgeon: Dane Hoang


Assistant: Maye Cha


Pathology: 





Placenta


Output, Urine Amount: 400


EBL in mLs: 600


Complications: None


Condition: Good

## 2021-03-18 NOTE — PCM.SN.2
- Free Text/Narrative


Note: 





Called by HANG Whitfield concerning patient SBP 80-90's at times and occasionally 

dizzy.  I went to see patient and she stated that she doesn't feel bad and is 

just sometimes a little dizzy. Medicated with IV Ephedrine 5 mg x 2 over 10 

minutes with SBP low 100's.

## 2021-03-18 NOTE — [id number]
Post Anesthesia Note





- EVALUATION WITHIN 48HRS OF ANESTHETIC


Vital Signs in Normal Range: Yes


Patient Participated in Evaluation: Yes


Respiratory Function Stable: Yes


Airway Patent: Yes


Cardiovascular Function Stable: Yes


Hydration Status Stable: Yes


Pain Control Satisfactory: Yes


Nausea and Vomiting Control Satisfactory: Yes


Mental Status Recovered: Yes


Vital Signs: 


                                Last Vital Signs











Temp  36.4 C   03/18/21 20:00


 


Pulse  91   03/18/21 23:00


 


Resp  17   03/18/21 23:00


 


BP  95/48 L  03/18/21 20:00


 


Pulse Ox  95   03/18/21 23:00














- COMMENTS/OBSERVATIONS


Free Text/Narrative:: 





SBP still 90's occas but RN states that patient does not feel dizzy or 

symptomatic and the patient just told me the same thing.

## 2021-03-18 NOTE — PCM.PREANE
Preanesthetic Assessment





- Anesthesia/Transfusion/Family Hx


Anesthesia History: Prior Anesthesia Without Reaction


Other Type of Anesthesia Reaction Comment: Family History of Anesthesia:  The 

patient is "not sure".  


Family History of Anesthesia Reaction: No


Transfusion History: No Prior Transfusion(s)


Intubation History: Unknown





- Review of Systems


General: No Symptoms


Pulmonary: No Symptoms


Cardiovascular: No Symptoms


Gastrointestinal: Abdominal Pain (labor pain)


Neurological: No Symptoms


Other: Reports: None





- Physical Assessment


Height: 5 ft 9 in


ASA Class: 2E


Mental Status: Alert & Oriented x3


Airway Class: Mallampati = 1


Dentition: Reports: Normal Dentition


Thyro-Mental Finger Breadths: 3


Mouth Opening Finger Breadths: 3


ROM/Head Extension: Full


Lungs: Clear to Auscultation, Normal Respiratory Effort


Cardiovascular: Regular Rate, Regular Rhythm





- Lab


Values: 





                             Laboratory Last Values











WBC  14.28 K/uL (4.0-11.0)  H  21  10:20    


 


RBC  3.93 M/uL (4.30-5.90)  L  21  10:20    


 


Hgb  12.3 g/dL (12.0-16.0)   21  10:20    


 


Hct  36.7 % (36.0-46.0)   21  10:20    


 


MCV  93.4 fL (80.0-98.0)   21  10:20    


 


MCH  31.3 pg (27.0-32.0)   21  10:20    


 


MCHC  33.5 g/dL (31.0-37.0)   21  10:20    


 


RDW Std Deviation  48.2 fl (28.0-62.0)   21  10:20    


 


RDW Coeff of Isidro  14 % (11.0-15.0)   21  10:20    


 


Plt Count  310 K/uL (150-400)   21  10:20    


 


MPV  9.20 fL (7.40-12.00)   21  10:20    


 


Nucleated RBC %  0.0 /100WBC  21  10:20    


 


Nucleated RBCs #  0 K/uL  21  10:20    


 


POC Glucose  100 mg/dL ()   21  10:42    














- Allergies


Allergies/Adverse Reactions: 


                                    Allergies











Allergy/AdvReac Type Severity Reaction Status Date / Time


 


nalbuphine [From Nubain] Allergy  Hallucinati Verified 21 08:15





   ons  


 


penicillin G Allergy  Rash Verified 21 08:15


 


Sulfa (Sulfonamide Allergy  Rash Verified 21 08:15





Antibiotics)     














- Blood


Blood Available: No





- Anesthesia Plan


Pre-Op Medication Ordered: None





- Acknowledgements


Anesthesia Type Planned: Spinal


Pt an Appropriate Candidate for the Planned Anesthesia: Yes


Alternatives and Risks of Anesthesia Discussed w Pt/Guardian: Yes


Pt/Guardian Understands and Agrees with Anesthesia Plan: Yes





PreAnesthesia Questionnaire


HEENT History: Reports: Other (See Below)


Other HEENT History: wears glasses


Cardiovascular History: Reports: None


Respiratory History: Reports: None


Gastrointestinal History: Reports: GERD


Genitourinary History: Reports: None


OB/GYN History: Reports: None, Pregnancy


Musculoskeletal History: Reports: None


Neurological History: Reports: None


Psychiatric History: Reports: None


Endocrine/Metabolic History: Reports: Diabetes, Gestational


Hematologic History: Reports: Anemia


Immunologic History: Reports: None


Oncologic (Cancer) History: Reports: None


Dermatologic History: Reports: None





- Infectious Disease History


Infectious Disease History: Reports: Chicken Pox





- Past Surgical History


Head Surgeries/Procedures: Reports: None


HEENT Surgical History: Reports: None


Cardiovascular Surgical History: Reports: None


Respiratory Surgical History: Reports: None


GI Surgical History: Reports: None


Female  Surgical History: Reports:  Section (x3, in active labor)


Endocrine Surgical History: Reports: None


Neurological Surgical History: Reports: None


Musculoskeletal Surgical History: Reports: None


Oncologic Surgical History: Reports: None


Dermatological Surgical History: Reports: None





- SUBSTANCE USE


Tobacco Use Status *Q: Never Tobacco User


Second Hand Smoke Exposure: No


Recreational Drug Use History: No





- HOME MEDS


Home Medications: 


                                    Home Meds





Pnv No.95/Ferrous Fum/Folic AC [Prenatal Vitamin Tablet] 1 tab PO DAILY 18

[History]











- CURRENT (IN HOUSE) MEDS


Current Meds: 





                               Current Medications





Oxytocin/Sodium Chloride (Oxytocin 30 Unit/500 Ml-Ns)  30 unit in 500 mls @ 250 

mls/hr IV TITRATE TARAN


Lactated Ringer's (Ringers, Lactated)  1,000 mls @ 500 mls/hr IV BOLUS TARAN


   Last Admin: 21 10:40 Dose:  999 mls/hr


   Documented by: 


Sodium Chloride (Sodium Chloride 0.9% 10 Ml Syringe)  10 ml FLUSH ASDIRECTED PRN


   PRN Reason: Keep Vein Open


Sodium Chloride (Sodium Chloride 0.9% 2.5 Ml Syringe)  2.5 ml FLUSH ASDIRECTED 

PRN


   PRN Reason: Keep Vein Open


Sodium Chloride (Sodium Chloride 0.9% 10 Ml Sdv)  10 ml IV ASDIRECTED PRN


   PRN Reason: IV Use





Discontinued Medications





Cefazolin Sodium (Cefazolin 1 Gm Vial) Confirm Administered Dose 2 gm .ROUTE 

.STK-MED ONE


   Stop: 21 10:37


Citric Acid/Sodium Citrate (Citric Acid/Sodium Citrate Solution 30 Ml Cup)  30 

ml PO ONETIME ONE


   Stop: 21 10:14


Fentanyl (Fentanyl 100 Mcg/2 Ml Sdv) Confirm Administered Dose 100 mcg .ROUTE 

.STK-MED ONE


   Stop: 21 10:25


Cefazolin Sodium/Dextrose 2 gm (/ Premix)  50 mls @ 100 mls/hr IV ONETIME ONE


   Stop: 21 10:42


Sodium Chloride (Normal Saline) Confirm Administered Dose 20 mls @ as directed 

.ROUTE .STK-MED ONE


   Stop: 21 10:37


Morphine Sulfate (Morphine Pf 10 Mg/10 Ml Sdv) Confirm Administered Dose 10 mg 

.ROUTE .STK-MED ONE


   Stop: 21 10:25


Ondansetron HCl (Ondansetron 4 Mg/2 Ml Sdv) Confirm Administered Dose 4 mg 

.ROUTE .STK-MED ONE


   Stop: 21 10:30


Oxytocin (Oxytocin 10 Units/1 Ml Sdv) Confirm Administered Dose 20 unit .ROUTE 

.STK-MED ONE


   Stop: 21 10:36


Phenylephrine HCl (Phenylephrine 1% 10 Mg/Ml Sdv) Confirm Administered Dose 10 

mg .ROUTE .STK-MED ONE


   Stop: 21 10:25

## 2021-03-19 NOTE — OR
SURGEON:

Dane Hoang MD

 

DATE OF PROCEDURE:  2021

 

INDICATION FOR PROCEDURE:

A 35-year-old, G4, P 3-0-0-3, at 38 weeks and 0 days presenting with early

labor.  The patient has a history of three prior C-sections.  She started having

mild contractions last night, gotten stronger and stronger throughout

the night.  When she presented to Labor and Delivery, she was carol every

2 to 3 minutes.  Membrane was intact, and she was 3 cm dilated.  The fetal heart

rate was reassuring.  The pregnancy was complicated by gestational

diabetes, which was moderately controlled with insulin.  She was not compliant

with blood sugar testing.  Weekly  testings with BPP and NSTs were

reassuring.  The patient was brought to the OR for repeat  urgently

after Anesthesia and OR team were informed.

 

PREOPERATIVE DIAGNOSES:

1. Pryor intrauterine pregnancy at 38 weeks and 0 days.

2. Prior  section x3.

3. Insulin-controlled gestational diabetes.

4. Early labor.

 

POSTOPERATIVE DIAGNOSES:

1. Pryor intrauterine pregnancy at 38 weeks and 0 days.

2. Prior  section x3.

3. Insulin-controlled gestational diabetes.

4. Early labor.

 

PROCEDURE PERFORMED:

Repeat low transverse  section.

 

ANESTHESIOLOGIST:

Dr. Delmar Hurtado.

 

ANESTHESIA:

Spinal.

 

FINDINGS:

A viable female infant.  Apgar score of 8 and 9.  Fetal weight of 8 pounds 5

ounces.  There were significant omental adhesions to the rectus muscles as well

as bladder adhesions to the lower uterine segment.  The uterus was otherwise

normal appearing.  Normal-appearing bilateral fallopian tubes and ovaries.

 

DESCRIPTION OF PROCEDURE:

The procedure was discussed with the patient.  Risks and complications including

bleeding, infection, DVTs, injury to surrounding organs including bladder and

bowel and ureter.  The patient expressed understanding.  Consent signed.  The

patient was brought to the operating room and spinal anesthesia was applied.  A

Allan catheter was placed.  The abdomen was prepped with chlorhexidine in

sterile fashion, then draped and tested for anesthesia.  Spinal was adequate.

Pfannenstiel incision was made at the site of the previous incision with the

scalpel and dissected down to fascia.  Adhesions from previous  were

noted and sites of bleeding were cauterized.  The fascia was cleared of

subcutaneous tissue.  The fascia was incised in the midline and extended

laterally with curved Perkins scissors.  Kocher clamps were placed on the superior

fascial edge.  The rectus muscles were carefully  by blunt dissection

and using Perkins scissors.  The same process was repeated for the inferior edge.

Peritoneum was identified.  It was entered bluntly and it was noted there were

omental adhesions posterior to the rectus muscle.  Therefore, it was carefully

dissected and  in the midline to avoid injury to bowel or bladder.  A

large Trace O retractor was placed in the peritoneal cavity.  The bladder was

noted to be adhered to the lower uterine segment.  A bladder flap was made by

gently dissecting the vesicouterine peritoneum with Metzenbaum scissors and

pushing it inferior to the lower uterine segment.  The lower uterine segment was

noted to be very thin.  It was incised using the scalpel and extended bluntly.

Meconium-stained amniotic fluid was noted.  The infant's head was brought to the

hysterotomy manually and delivered atraumatically with fundal pressure.  The

shoulder and body were delivered without difficulty.  No nuchal cord was noted.

The nose and mouth were suctioned.  Umbilical cord was clamped and cut after 60

seconds and no longer pulsating.  The baby was pink, crying vigorously, and

moving all extremities immediately after delivery.  The baby was brought over to

awaiting nursery staff.  Cord gases were obtained.  The placenta was delivered

with gentle traction on the umbilical cord.  A clean lap was used to remove all

remaining membranes from the uterine cavity.  Allis clamps were used to grasp

the angles and lower edge of the incision.  The uterine incision was closed in

two layers, the first layer with running locking fashion using 0 Monocryl, the

second layer in vertical imbricated fashion using 0 Vicryl.  A figure-of-eight

was placed on the left side of the hysterotomy for hemostasis. The bloods clots

were removed from pericolic gutters and cavity was irrigated. The hysterotomy

was hemostatic but there was some light bleeding from the bladder peritoneum. 

Bovie cautery was used, however, there was still small amount of bleeding.  

Maikol was placed over the bleeding areas and hemostasis was confirmed.  

The Trace retractor was removed.  The peritoneum was grasped

by hemostats and brought together to the midline.  It was closed using 2-0 
Vicryl

in running fashion incorporating the rectus muscle. Rectus muscles were examined


and found to be hemostatic.   The fascia was closed using two 0 Vicryl sutures 
in 

running fashion.  The subcutaneous tissue was irrigated and bleeding area was

cauterized.  The subcutaneous layer was brought together with 2-0 plain in a

running fashion.  The skin was closed in subcuticular fashion using 3-0 Monocryl

on a Tano needle.  Gauze and ABD dressing were placed over the incision.  The

patient was stable and transferred to recovery room.

 

 

MATTHIAS VERMA

DD:  2021 19:21:37

DT:  2021 00:43:54

Job #:  [id number]

KATHLEEN

## 2021-03-19 NOTE — PCM.PNPP
- General Info


Date of Service: 21


Functional Status: Reports: Pain Controlled, Tolerating Diet, Ambulating, 

Urinating





- Review of Systems


General: Reports: No Symptoms


HEENT: Reports: No Symptoms


Pulmonary: Reports: No Symptoms


Cardiovascular: Reports: No Symptoms


Gastrointestinal: Reports: No Symptoms


Genitourinary: Reports: No Symptoms


Musculoskeletal: Reports: No Symptoms


Skin: Reports: No Symptoms


Neurological: Reports: No Symptoms


Psychiatric: Reports: No Symptoms





- Patient Data


Vital Signs - Most Recent: 


                                Last Vital Signs











Temp  36.5 C   21 08:00


 


Pulse  75   21 10:00


 


Resp  18   21 08:00


 


BP  113/64   21 08:00


 


Pulse Ox  95   21 11:00











I&O - Last 24 Hours: 


                                 Intake & Output











 21





 22:59 06:59 14:59


 


Intake Total 1700  


 


Output Total 750 1350 1100


 


Balance 950 -1350 -1100











Lab Results - Last 24 Hours: 


                         Laboratory Results - last 24 hr











  21 Range/Units





  10:20 10:35 11:30 


 


WBC     (4.0-11.0)  K/uL


 


RBC     (4.30-5.90)  M/uL


 


Hgb     (12.0-16.0)  g/dL


 


Hct     (36.0-46.0)  %


 


MCV     (80.0-98.0)  fL


 


MCH     (27.0-32.0)  pg


 


MCHC     (31.0-37.0)  g/dL


 


RDW Std Deviation     (28.0-62.0)  fl


 


RDW Coeff of Isidro     (11.0-15.0)  %


 


Plt Count     (150-400)  K/uL


 


MPV     (7.40-12.00)  fL


 


Nucleated RBC %     /100WBC


 


Nucleated RBCs #     K/uL


 


Cord ABG pH     (7.18-7.38)  


 


Cord ABG Base Excess     (-10--2)  


 


Cord VBG pH    7.284  (7.25-7.45)  


 


Cord VBG Base Excess    -4  (-10--2)  


 


Glucose     ()  mg/dL


 


SARS-CoV-2 RNA (ANNABELLE)   NEGATIVE   (NEGATIVE)  


 


Blood Type  B NEGATIVE    


 


Antibody Screen  POSITIVE    


 


Antibody Identification  Anti-D    


 


Fetal Screen     (NEGATIVE)  


 


RhIG Candidate?     


 


Rhogam Indicated     














  21 Range/Units





  11:30 13:24 22:49 


 


WBC    10.08  (4.0-11.0)  K/uL


 


RBC    3.30 L  (4.30-5.90)  M/uL


 


Hgb    10.5 L  (12.0-16.0)  g/dL


 


Hct    31.0 L  (36.0-46.0)  %


 


MCV    93.9  (80.0-98.0)  fL


 


MCH    31.8  (27.0-32.0)  pg


 


MCHC    33.9  (31.0-37.0)  g/dL


 


RDW Std Deviation    48.9  (28.0-62.0)  fl


 


RDW Coeff of Isidro    14  (11.0-15.0)  %


 


Plt Count    271  (150-400)  K/uL


 


MPV    8.90  (7.40-12.00)  fL


 


Nucleated RBC %    0.0  /100WBC


 


Nucleated RBCs #    0  K/uL


 


Cord ABG pH  7.237    (7.18-7.38)  


 


Cord ABG Base Excess  -5    (-10--2)  


 


Cord VBG pH     (7.25-7.45)  


 


Cord VBG Base Excess     (-10--2)  


 


Glucose     ()  mg/dL


 


SARS-CoV-2 RNA (ANNABELLE)     (NEGATIVE)  


 


Blood Type     


 


Antibody Screen     


 


Antibody Identification     


 


Fetal Screen   NEGATIVE   (NEGATIVE)  


 


RhIG Candidate?   YES   


 


Rhogam Indicated   YES, BABY RH POS H   














  21 Range/Units





  05:22 05:22 


 


WBC    (4.0-11.0)  K/uL


 


RBC    (4.30-5.90)  M/uL


 


Hgb  10.0 L   (12.0-16.0)  g/dL


 


Hct  30.2 L   (36.0-46.0)  %


 


MCV    (80.0-98.0)  fL


 


MCH    (27.0-32.0)  pg


 


MCHC    (31.0-37.0)  g/dL


 


RDW Std Deviation    (28.0-62.0)  fl


 


RDW Coeff of Isidro    (11.0-15.0)  %


 


Plt Count    (150-400)  K/uL


 


MPV    (7.40-12.00)  fL


 


Nucleated RBC %    /100WBC


 


Nucleated RBCs #    K/uL


 


Cord ABG pH    (7.18-7.38)  


 


Cord ABG Base Excess    (-10--2)  


 


Cord VBG pH    (7.25-7.45)  


 


Cord VBG Base Excess    (-10--2)  


 


Glucose   92  ()  mg/dL


 


SARS-CoV-2 RNA (ANNABELLE)    (NEGATIVE)  


 


Blood Type    


 


Antibody Screen    


 


Antibody Identification    


 


Fetal Screen    (NEGATIVE)  


 


RhIG Candidate?    


 


Rhogam Indicated    











Med Orders - Current: 


                               Current Medications





Bisacodyl (Bisacodyl 10 Mg Supp)  10 mg RECTAL ONETIME PRN


   PRN Reason: Constipation


Diphenhydramine HCl (Diphenhydramine 50 Mg/Ml Sdv)  25 mg IVPUSH Q6H PRN


   PRN Reason: Itching or Nausea


   Last Admin: 21 22:59 Dose:  25 mg


   Documented by: 


Docusate Sodium (Docusate Sodium 100 Mg Cap)  100 mg PO BID [facility]


   Last Admin: 21 08:18 Dose:  100 mg


   Documented by: 


Emollient Ointment (Lanolin 100% Cream 7 Gm Tube)  0 gm TOP ASDIRECTED PRN


   PRN Reason: Sore Nipples


Fentanyl (Fentanyl 100 Mcg/2 Ml Sdv)  50 mcg IVPUSH Q1H PRN


   PRN Reason: Pain (severe 7-10)


Lactated Ringer's (Ringers, Lactated)  1,000 mls @ 125 mls/hr IV ASDIRECTED [facility]


   Last Admin: 21 18:54 Dose:  125 mls/hr


   Documented by: 


Tranexamic Acid 1,000 mg/ (Sodium Chloride)  110 mls @ 660 mls/hr IV ONETIME PRN


   PRN Reason: Bleeding


Ibuprofen (Ibuprofen 800 Mg Tab)  800 mg PO Q8H PRN


   PRN Reason: mild pain or fever


Ketorolac Tromethamine (Ketorolac 30 Mg/Ml Sdv)  30 mg IVPUSH Q6H [facility]


   Stop: 21 13:01


   Last Admin: 21 08:17 Dose:  30 mg


   Documented by: 


Methylergonovine Maleate (Methylergonovine 0.2 Mg/1 Ml Amp)  0.2 mg IM ONETIME 

PRN


   PRN Reason: Excessive Vaginal Bleeding


Misoprostol (Misoprostol 200 Mcg Tab)  1,000 mcg RECTAL ONETIME PRN


   PRN Reason: excessive bleeding


Ondansetron HCl (Ondansetron 4 Mg/2 Ml Sdv)  4 mg IVPUSH Q6H PRN


   PRN Reason: Nausea


Ondansetron HCl (Ondansetron 4 Mg/2 Ml Sdv)  4 mg IVPUSH Q4H PRN


   PRN Reason: Nausea/Vomiting


Oxycodone/Acetaminophen (Acetaminophen/Oxycodone 325-5 Mg Tab)  2 tab PO Q6H PRN


   PRN Reason: Pain (moderate 4-6)


Oxycodone/Acetaminophen (Acetaminophen/Oxycodone 325-5 Mg Tab)  1 tab PO Q4H PRN


   PRN Reason: Pain (moderate 4-6)


Oxycodone/Acetaminophen (Acetaminophen/Oxycodone 325-5 Mg Tab)  2 tab PO Q4H PRN


   PRN Reason: Pain (moderate 4-6)


Oxytocin (Oxytocin 10 Units/1 Ml Sdv)  10 unit IM ASDIRECTED PRN


   PRN Reason: Excessive Vaginal Bleeding


Sodium Chloride (Sodium Chloride 0.9% 10 Ml Syringe)  10 ml FLUSH ASDIRECTED PRN


   PRN Reason: Keep Vein Open


Sodium Chloride (Sodium Chloride 0.9% 2.5 Ml Syringe)  2.5 ml FLUSH ASDIRECTED 

PRN


   PRN Reason: Keep Vein Open





Discontinued Medications





Cefazolin Sodium (Cefazolin 1 Gm Vial) Confirm Administered Dose 2 gm .ROUTE 

.STK-MED ONE


   Stop: 21 10:37


Citric Acid/Sodium Citrate (Citric Acid/Sodium Citrate Solution 30 Ml Cup)  30 

ml PO ONETIME ONE


   Stop: 21 10:14


   Last Admin: 21 23:47 Dose:  Not Given


   Documented by: 


Diphenhydramine HCl (Diphenhydramine 50 Mg/Ml Sdv)  25 mg IVPUSH Q4H PRN


   PRN Reason: Itching


   Stop: 21 11:38


Ephedrine Sulfate (Ephedrine 50 Mg/Ml Sdv) Confirm Administered Dose 50 mg 

.ROUTE .STK-MED ONE


   Stop: 21 15:12


Fentanyl (Fentanyl 100 Mcg/2 Ml Sdv) Confirm Administered Dose 100 mcg .ROUTE 

.STK-MED ONE


   Stop: 21 10:25


Oxytocin/Sodium Chloride (Oxytocin 30 Unit/500 Ml-Ns)  30 unit in 500 mls @ 250 

mls/hr IV TITRATE TARAN


Cefazolin Sodium/Dextrose 2 gm (/ Premix)  50 mls @ 100 mls/hr IV ONETIME ONE


   Stop: 21 10:42


   Last Admin: 21 21:28 Dose:  Not Given


   Documented by: 


Lactated Ringer's (Ringers, Lactated)  1,000 mls @ 500 mls/hr IV BOLUS [facility]


   Last Admin: 21 10:40 Dose:  999 mls/hr


   Documented by: 


Sodium Chloride (Normal Saline) Confirm Administered Dose 20 mls @ as directed 

.ROUTE .STK-MED ONE


   Stop: 21 10:37


Morphine Sulfate (Morphine Pf 10 Mg/10 Ml Sdv) Confirm Administered Dose 10 mg 

.ROUTE .STK-MED ONE


   Stop: 21 10:25


Naloxone HCl (Naloxone 0.4 Mg/Ml Syringe)  0.1 mg IVPUSH ONETIME PRN


   PRN Reason: Respiratory Depression


   Stop: 21 11:38


Ondansetron HCl (Ondansetron 4 Mg/2 Ml Sdv) Confirm Administered Dose 4 mg 

.ROUTE .STK-MED ONE


   Stop: 21 10:30


Oxytocin (Oxytocin 10 Units/1 Ml Sdv) Confirm Administered Dose 20 unit .ROUTE 

.STK-MED ONE


   Stop: 21 10:36


Phenylephrine HCl (Phenylephrine 1% 10 Mg/Ml Sdv) Confirm Administered Dose 10 

mg .ROUTE .STK-MED ONE


   Stop: 21 10:25


Sodium Chloride (Sodium Chloride 0.9% 10 Ml Sdv)  10 ml IV ASDIRECTED PRN


   PRN Reason: IV Use











- Infant Interaction


Support Person: 





- Postpartum Recovery Exam


Fundal Tone: Firm


Fundal Level: 2 Fingerbreadths Above Umbilicus


Fundal Placement: Midline


Lochia Amount: Scant


Lochia Color: Rubra/Red


Perineum Description: Intact, Minimal Bruising/Swelling


Episiotomy/Laceration: None


Bladder Status: Voiding





- Exam


General: Alert, Oriented, Cooperative, No Acute Distress


HEENT: Pupils Equal, Pupils Reactive


Neck: Supple, Trachea Midline, No JVD


Lungs: Normal Respiratory Effort


GI/Abdominal Exam: Soft, Non-Tender, No Distention


Extremities: Normal Inspection, Normal Range of Motion, Non-Tender, No Pedal 

Edema


Skin: Warm, Intact


Wound/Incisions: Healing Well


Neurological: No New Focal Deficit


Psy/Mental Status: Alert, Normal Affect, Normal Mood





- Problem List Review


Problem List Initiated/Reviewed/Updated: Yes





- My Orders


Last 24 Hours: 


My Active Orders





21 Lunch


Regular Diet [DIET] 





21 12:49


Patient Status [ADT] Routine 


Ambulate [RC] PER UNIT ROUTINE 


Antiembolic Devices [RC] PER UNIT ROUTINE 


Communication Order [RC] PER UNIT ROUTINE 


Intake and Output [RC] Q4H 


May Shower [RC] ASDIRECTED 


Notify Provider Intake and Out [RC] ASDIRECTED 


Notify Provider Vital Signs [RC] ASDIRECTED 


RT Incentive Spirometry [RC] Q2HWA 


Acetaminophen/oxyCODONE [Percocet 325-5 MG]   1 tab PO Q4H PRN 


Acetaminophen/oxyCODONE [Percocet 325-5 MG]   2 tab PO Q4H PRN 


Lanolin [Lansinoh HPA]   See Dose Instructions  TOP ASDIRECTED PRN 


Methylergonovine [Methergine]   0.2 mg IM ONETIME PRN 


Ondansetron [Zofran]   4 mg IVPUSH Q4H PRN 


Oxytocin [Pitocin]   10 unit IM ASDIRECTED PRN 


Tranexamic Acid [Cyklokapron] 1,000 mg   Sodium Chloride 0.9% [Normal Saline] 

100 ml IV ONETIME 


bisacodyL [Dulcolax]   10 mg RECTAL ONETIME PRN 


diphenhydrAMINE [Benadryl]   25 mg IVPUSH Q6H PRN 


miSOPROStoL [Cytotec]   1,000 mcg RECTAL ONETIME PRN 


Abdominal Binder [OM.PC] Per Unit Routine 


Assess Lochia [WOMSER] Per Unit Routine 


Assess Uterine Involution [WOMSER] Per Unit Routine 


Breast Pump [WOMSER] Per Unit Routine 


Ice Therapy [OM.PC] Per Unit Routine 


Peripheral IV Discontinue [OM.PC] Routine 


Sequential Compression Device [OM.PC] Per Unit Routine 





21 13:00


Ketorolac [Toradol]   30 mg IVPUSH Q6H 


Lactated Ringers [Ringers, Lactated] 1,000 ml IV ASDIRECTED 





21 13:24


FETAL SCREEN [BBK] Routine 


RH IMMUNE GLOBULIN [BBK] Routine 


RHOGAM, POSTPARTUM [RHIG WORKUP, POSTPARTUM] [BBK] Routine 





21 21:00


Docusate Sodium [Colace]   100 mg PO BID 





21 19:00


Ibuprofen [Motrin]   800 mg PO Q8H PRN 














- Assessment


Assessment:: 





36yo  POD1 s/p repeat  after presenting in labor. Pregnancy 

complicated by GDMA2. Stable and recovering well. 





- Plan


Plan:: 


- Borderline hypotensive overnight, now is improved. Repeat Hgb stable, good UO.


- tolerating PO and ambulating


- pain controlled, feels percocets may be causing headaches, advised her to try 

Norco instead


- incision healing well


- GDMA2, normal fasting blood sugar


Plan for discharge home tomorrow. Reviewed postpartum care instructions.

## 2021-03-20 NOTE — PCM.PNPP
- General Info


Date of Service: 21


Subjective Update: 





34yo P4 s/p  , GDMA2  


She denies any complains today


Breastfeeding 


Functional Status: Reports: Pain Controlled, Tolerating Diet, Ambulating, 

Urinating





- Review of Systems


General: Reports: No Symptoms


HEENT: Reports: No Symptoms


Pulmonary: Reports: No Symptoms


Cardiovascular: Reports: No Symptoms


Gastrointestinal: Reports: No Symptoms


Genitourinary: Reports: No Symptoms


Musculoskeletal: Reports: No Symptoms


Skin: Reports: No Symptoms


Neurological: Reports: No Symptoms


Psychiatric: Reports: No Symptoms





- General Info


Date of Service: 21





- Patient Data


Vital Signs - Most Recent: 


                                Last Vital Signs











Temp  36.2 C   21 07:57


 


Pulse  76   21 07:57


 


Resp  16   21 07:57


 


BP  123/75   21 07:57


 


Pulse Ox  95   21 07:57











Lab Results - Last 24 Hours: 


                         Laboratory Results - last 24 hr











  21 Range/Units





  10:20 10:20 13:24 


 


RPR  Non-Reac    (Non-Reac)  


 


Antibody Screen   POSITIVE   


 


Fetal Screen    NEGATIVE  (NEGATIVE)  


 


RhIG Candidate?    YES  


 


Rhogam Indicated    YES, BABY RH POS H  











Med Orders - Current: 


                               Current Medications





Hydrocodone Bitart/Acetaminophen (Acetaminophen/Hydrocodone 325-10 Mg Tab)  1 

tab PO Q4H PRN


   PRN Reason: Abdominal Pain


   Last Admin: 21 08:30 Dose:  1 tab


   Documented by: 


Bisacodyl (Bisacodyl 10 Mg Supp)  10 mg RECTAL ONETIME PRN


   PRN Reason: Constipation


Diphenhydramine HCl (Diphenhydramine 50 Mg/Ml Sdv)  25 mg IVPUSH Q6H PRN


   PRN Reason: Itching or Nausea


   Last Admin: 21 22:59 Dose:  25 mg


   Documented by: 


Docusate Sodium (Docusate Sodium 100 Mg Cap)  100 mg PO BID ATRAN


   Last Admin: 21 08:30 Dose:  100 mg


   Documented by: 


Emollient Ointment (Lanolin 100% Cream 7 Gm Tube)  0 gm TOP ASDIRECTED PRN


   PRN Reason: Sore Nipples


Fentanyl (Fentanyl 100 Mcg/2 Ml Sdv)  50 mcg IVPUSH Q1H PRN


   PRN Reason: Pain (severe 7-10)


Lactated Ringer's (Ringers, Lactated)  1,000 mls @ 125 mls/hr IV ASDIRECTED TARAN


   Last Admin: 21 18:54 Dose:  125 mls/hr


   Documented by: 


Tranexamic Acid 1,000 mg/ (Sodium Chloride)  110 mls @ 660 mls/hr IV ONETIME PRN


   PRN Reason: Bleeding


Ibuprofen (Ibuprofen 800 Mg Tab)  800 mg PO Q8H PRN


   PRN Reason: mild pain or fever


   Last Admin: 21 01:01 Dose:  800 mg


   Documented by: 


Methylergonovine Maleate (Methylergonovine 0.2 Mg/1 Ml Amp)  0.2 mg IM ONETIME 

PRN


   PRN Reason: Excessive Vaginal Bleeding


Misoprostol (Misoprostol 200 Mcg Tab)  1,000 mcg RECTAL ONETIME PRN


   PRN Reason: excessive bleeding


Ondansetron HCl (Ondansetron 4 Mg/2 Ml Sdv)  4 mg IVPUSH Q6H PRN


   PRN Reason: Nausea


Ondansetron HCl (Ondansetron 4 Mg/2 Ml Sdv)  4 mg IVPUSH Q4H PRN


   PRN Reason: Nausea/Vomiting


Oxytocin (Oxytocin 10 Units/1 Ml Sdv)  10 unit IM ASDIRECTED PRN


   PRN Reason: Excessive Vaginal Bleeding


Sodium Chloride (Sodium Chloride 0.9% 10 Ml Syringe)  10 ml FLUSH ASDIRECTED PRN


   PRN Reason: Keep Vein Open


Sodium Chloride (Sodium Chloride 0.9% 2.5 Ml Syringe)  2.5 ml FLUSH ASDIRECTED 

PRN


   PRN Reason: Keep Vein Open





Discontinued Medications





Cefazolin Sodium (Cefazolin 1 Gm Vial) Confirm Administered Dose 2 gm .ROUTE 

.STK-MED ONE


   Stop: 21 10:37


Citric Acid/Sodium Citrate (Citric Acid/Sodium Citrate Solution 30 Ml Cup)  30 

ml PO ONETIME ONE


   Stop: 21 10:14


   Last Admin: 21 23:47 Dose:  Not Given


   Documented by: 


Diphenhydramine HCl (Diphenhydramine 50 Mg/Ml Sdv)  25 mg IVPUSH Q4H PRN


   PRN Reason: Itching


   Stop: 21 11:38


Ephedrine Sulfate (Ephedrine 50 Mg/Ml Sdv) Confirm Administered Dose 50 mg 

.ROUTE .STK-MED ONE


   Stop: 21 15:12


Fentanyl (Fentanyl 100 Mcg/2 Ml Sdv) Confirm Administered Dose 100 mcg .ROUTE 

.STK-MED ONE


   Stop: 21 10:25


Oxytocin/Sodium Chloride (Oxytocin 30 Unit/500 Ml-Ns)  30 unit in 500 mls @ 250 

mls/hr IV TITRATE [facility]


Cefazolin Sodium/Dextrose 2 gm (/ Premix)  50 mls @ 100 mls/hr IV ONETIME ONE


   Stop: 21 10:42


   Last Admin: 21 21:28 Dose:  Not Given


   Documented by: 


Lactated Ringer's (Ringers, Lactated)  1,000 mls @ 500 mls/hr IV BOLUS [facility]


   Last Admin: 21 10:40 Dose:  999 mls/hr


   Documented by: 


Sodium Chloride (Normal Saline) Confirm Administered Dose 20 mls @ as directed 

.ROUTE .STK-MED ONE


   Stop: 21 10:37


Ketorolac Tromethamine (Ketorolac 30 Mg/Ml Sdv)  30 mg IVPUSH Q6H [facility]


   Stop: 21 13:01


   Last Admin: 21 14:14 Dose:  30 mg


   Documented by: 


Morphine Sulfate (Morphine Pf 10 Mg/10 Ml Sdv) Confirm Administered Dose 10 mg 

.ROUTE .STK-MED ONE


   Stop: 21 10:25


Naloxone HCl (Naloxone 0.4 Mg/Ml Syringe)  0.1 mg IVPUSH ONETIME PRN


   PRN Reason: Respiratory Depression


   Stop: 21 11:38


Ondansetron HCl (Ondansetron 4 Mg/2 Ml Sdv) Confirm Administered Dose 4 mg 

.ROUTE .STK-MED ONE


   Stop: 21 10:30


Oxycodone/Acetaminophen (Acetaminophen/Oxycodone 325-5 Mg Tab)  2 tab PO Q6H PRN


   PRN Reason: Pain (moderate 4-6)


Oxytocin (Oxytocin 10 Units/1 Ml Sdv) Confirm Administered Dose 20 unit .ROUTE 

.STK-MED ONE


   Stop: 21 10:36


Phenylephrine HCl (Phenylephrine 1% 10 Mg/Ml Sdv) Confirm Administered Dose 10 

mg .ROUTE .STK-MED ONE


   Stop: 21 10:25


Sodium Chloride (Sodium Chloride 0.9% 10 Ml Sdv)  10 ml IV ASDIRECTED PRN


   PRN Reason: IV Use











- Infant Interaction


Support Person: 





- Postpartum Recovery Exam


Fundal Tone: Firm


Fundal Level: 2 Fingerbreadths Below Umbilicus


Fundal Placement: Midline


Lochia Amount: Scant


Lochia Color: Rubra/Red


Perineum Description: Intact, Minimal Bruising/Swelling


Episiotomy/Laceration: None


Bladder Status: Voiding


Urinary Elimination: Voided





- Exam


General: Alert


HEENT: Pupils Equal


Neck: Supple


Lungs: Clear to Auscultation


Cardiovascular: Regular Rate, Regular Rhythm


GI/Abdominal Exam: Normal Bowel Sounds


Extremities: Normal Inspection


Wound/Incisions: Dressing Dry and Intact


Neurological: No New Focal Deficit


Psy/Mental Status: Alert





- Problem List & Annotations


(1)  delivery delivered


SNOMED Code(s): 923778382


   Code(s): O82 - ENCOUNTER FOR  DELIVERY WITHOUT INDICATION   Status: 

Resolved   Current Visit: No   Onset Date: ~19   





- Problem List Review


Problem List Initiated/Reviewed/Updated: Yes





- My Orders


Last 24 Hours: 


My Active Orders





21 09:25


Ready for Discharge [RC] PER UNIT ROUTINE 














- Assessment


Assessment:: 





34yo  POD1 s/p repeat  after presenting in labor. Pregnancy 

complicated by GDMA2. Stable and recovering well. POD 2 





- Plan


Plan:: 


 BP stable


- tolerating PO and ambulating


- pain controlled no headaches with percocet 


- incision healing well


- GDMA2, Postpartum OGTT in 6 weeks 


Plan for discharge home today . Reviewed postpartum care instructions.